# Patient Record
Sex: MALE | Race: WHITE | NOT HISPANIC OR LATINO | Employment: UNEMPLOYED | URBAN - METROPOLITAN AREA
[De-identification: names, ages, dates, MRNs, and addresses within clinical notes are randomized per-mention and may not be internally consistent; named-entity substitution may affect disease eponyms.]

---

## 2017-02-22 ENCOUNTER — TELEPHONE (OUTPATIENT)
Dept: FAMILY MEDICINE | Facility: CLINIC | Age: 42
End: 2017-02-22

## 2017-02-22 NOTE — TELEPHONE ENCOUNTER
----- Message from Vaughn Maher sent at 2017 11:23 AM CST -----  Contact: Wife - Radha Bran Jr.  MRN: [id number]  : 1975  PCP: Sheyla Vargas  Home Phone      [phone]  Work Phone      Not on file.  Mobile          [phone]      MESSAGE: new patient - Berenice Miller' son -- requesting to have Dr Eisenberg as his PCP -- was seen here as a child -- having back pain & is not satisfied with the Dr he is seeing in [place] - states all they do is give him pain medication, not looking for the cause of his pain -- [facility] medicaid -- will you accept    Call Radha @ [phone]    PCP: ???????

## 2017-02-24 ENCOUNTER — TELEPHONE (OUTPATIENT)
Dept: FAMILY MEDICINE | Facility: CLINIC | Age: 42
End: 2017-02-24

## 2017-02-24 NOTE — TELEPHONE ENCOUNTER
----- Message from Vaughn Maher sent at 2017  4:05 PM CST -----  Contact: Mom - Berenice Bashir Shant Martinez.  MRN: [id number]  : 1975  PCP: Sheyla Vargas  Home Phone      [phone]  Work Phone      Not on file.  Mobile          [phone]      MESSAGE: patient requested Dr Eisenberg take him on as a new patient - informed them that Dr Eisenberg declined to see patient -- Ms Miller expressed that she has no confidence in any of the other physicians taking him on & would like us to ask again if Dr Eisenberg will take patient on     call Berenice @ [phone]    PCP: ????

## 2017-03-01 ENCOUNTER — TELEPHONE (OUTPATIENT)
Dept: FAMILY MEDICINE | Facility: CLINIC | Age: 42
End: 2017-03-01

## 2017-03-01 NOTE — TELEPHONE ENCOUNTER
----- Message from Summer Sea sent at 3/1/2017 11:13 AM CST -----  Contact: self  Maurice Bran Jr.  MRN: [id number]  : 1975  PCP: Sheyla Vargas  Home Phone      [phone]  Work Phone      Not on file.  Mobile          [phone]      MESSAGE: pt says elvia hay spoke with [facility] and he ok'd seeing pt here for back pn. Please call pt to schedule appt asap.    Phone: [phone]

## 2017-03-17 ENCOUNTER — OFFICE VISIT (OUTPATIENT)
Dept: FAMILY MEDICINE | Facility: CLINIC | Age: 42
End: 2017-03-17
Payer: MEDICAID

## 2017-03-17 VITALS
BODY MASS INDEX: 25.69 KG/M2 | DIASTOLIC BLOOD PRESSURE: 80 MMHG | SYSTOLIC BLOOD PRESSURE: 110 MMHG | WEIGHT: 200.19 LBS | HEIGHT: 74 IN | HEART RATE: 72 BPM

## 2017-03-17 DIAGNOSIS — M54.42 CHRONIC MIDLINE LOW BACK PAIN WITH LEFT-SIDED SCIATICA: ICD-10-CM

## 2017-03-17 DIAGNOSIS — M54.41 CHRONIC RIGHT-SIDED LOW BACK PAIN WITH RIGHT-SIDED SCIATICA: ICD-10-CM

## 2017-03-17 DIAGNOSIS — M53.3 SACROILIAC JOINT PAIN: ICD-10-CM

## 2017-03-17 DIAGNOSIS — G89.29 CHRONIC RIGHT-SIDED LOW BACK PAIN WITH RIGHT-SIDED SCIATICA: ICD-10-CM

## 2017-03-17 DIAGNOSIS — M48.02 FORAMINAL STENOSIS OF CERVICAL REGION: ICD-10-CM

## 2017-03-17 DIAGNOSIS — M47.22 OSTEOARTHRITIS OF SPINE WITH RADICULOPATHY, CERVICAL REGION: Primary | ICD-10-CM

## 2017-03-17 DIAGNOSIS — M54.2 NECK PAIN: ICD-10-CM

## 2017-03-17 DIAGNOSIS — G89.29 CHRONIC MIDLINE LOW BACK PAIN WITH LEFT-SIDED SCIATICA: ICD-10-CM

## 2017-03-17 DIAGNOSIS — F17.200 TOBACCO USE DISORDER: ICD-10-CM

## 2017-03-17 DIAGNOSIS — M54.12 CERVICAL RADICULOPATHY: ICD-10-CM

## 2017-03-17 DIAGNOSIS — M54.42 CHRONIC BILATERAL LOW BACK PAIN WITH BILATERAL SCIATICA: ICD-10-CM

## 2017-03-17 DIAGNOSIS — M54.41 CHRONIC BILATERAL LOW BACK PAIN WITH BILATERAL SCIATICA: ICD-10-CM

## 2017-03-17 DIAGNOSIS — G89.29 CHRONIC BILATERAL LOW BACK PAIN WITH BILATERAL SCIATICA: ICD-10-CM

## 2017-03-17 PROBLEM — M54.40 CHRONIC RIGHT-SIDED LOW BACK PAIN WITH SCIATICA: Status: ACTIVE | Noted: 2017-03-17

## 2017-03-17 PROCEDURE — 99999 PR PBB SHADOW E&M-EST. PATIENT-LVL III: CPT | Mod: PBBFAC,,, | Performed by: FAMILY MEDICINE

## 2017-03-17 PROCEDURE — 99213 OFFICE O/P EST LOW 20 MIN: CPT | Mod: PBBFAC | Performed by: FAMILY MEDICINE

## 2017-03-17 PROCEDURE — 99204 OFFICE O/P NEW MOD 45 MIN: CPT | Mod: S$PBB,,, | Performed by: FAMILY MEDICINE

## 2017-03-17 RX ORDER — HYDROCODONE BITARTRATE AND ACETAMINOPHEN 7.5; 325 MG/1; MG/1
1 TABLET ORAL 2 TIMES DAILY
Qty: 60 TABLET | Refills: 0 | Status: SHIPPED | OUTPATIENT
Start: 2017-03-17 | End: 2017-03-17 | Stop reason: SDUPTHER

## 2017-03-17 RX ORDER — AMITRIPTYLINE HYDROCHLORIDE 10 MG/1
10 TABLET, FILM COATED ORAL NIGHTLY PRN
Qty: 30 TABLET | Refills: 5 | Status: SHIPPED | OUTPATIENT
Start: 2017-03-17 | End: 2017-07-24 | Stop reason: SDUPTHER

## 2017-03-17 RX ORDER — CYCLOBENZAPRINE HCL 10 MG
10 TABLET ORAL NIGHTLY
Qty: 30 TABLET | Refills: 5 | Status: SHIPPED | OUTPATIENT
Start: 2017-03-17 | End: 2017-09-25 | Stop reason: SDUPTHER

## 2017-03-17 RX ORDER — HYDROCODONE BITARTRATE AND ACETAMINOPHEN 7.5; 325 MG/1; MG/1
1 TABLET ORAL 2 TIMES DAILY
Qty: 60 TABLET | Refills: 0 | Status: SHIPPED | OUTPATIENT
Start: 2017-05-17 | End: 2017-07-17

## 2017-03-17 RX ORDER — HYDROCODONE BITARTRATE AND ACETAMINOPHEN 7.5; 325 MG/1; MG/1
1 TABLET ORAL 2 TIMES DAILY
Qty: 60 TABLET | Refills: 0 | Status: SHIPPED | OUTPATIENT
Start: 2017-04-17 | End: 2017-05-28

## 2017-03-17 RX ORDER — CLONAZEPAM 1 MG/1
1 TABLET ORAL NIGHTLY
Qty: 30 TABLET | Refills: 5 | Status: SHIPPED | OUTPATIENT
Start: 2017-03-17 | End: 2017-07-24 | Stop reason: SDUPTHER

## 2017-03-17 RX ORDER — HYDROCODONE BITARTRATE AND ACETAMINOPHEN 7.5; 325 MG/1; MG/1
1 TABLET ORAL 2 TIMES DAILY
Qty: 60 TABLET | Refills: 0 | Status: SHIPPED | OUTPATIENT
Start: 2017-03-17 | End: 2017-03-27

## 2017-03-17 NOTE — MR AVS SNAPSHOT
[address] [address]  Phone: [phone]  Fax: [phone]                  Maurice Bran Jr.   3/17/2017 2:30 PM   Office Visit    Description:  Male : 1975   Provider:  Fam Eisenberg MD   Department:  [facility]           Reason for Visit     Establish Care     Back Pain     Neck Pain           Diagnoses this Visit        Comments    Osteoarthritis of spine with radiculopathy, cervical region    -  Primary     Chronic midline low back pain with left-sided sciatica         Chronic right-sided low back pain with right-sided sciatica         Tobacco use disorder         Cervical radiculopathy         Neck pain         Sacroiliac joint pain         Foraminal stenosis of cervical region         Chronic bilateral low back pain with bilateral sciatica                To Do List           Goals (5 Years of Data)     None      Follow-Up and Disposition     Return in about 3 months (around 2017).    Follow-up and Disposition History       These Medications        Disp Refills Start End    cyclobenzaprine (FLEXERIL) 10 MG tablet 30 tablet 5 3/17/2017     Take 1 tablet (10 mg total) by mouth every evening. - Oral    Pharmacy: [facility] [address] [place] [address] Ph #: [phone]       amitriptyline (ELAVIL) 10 MG tablet 30 tablet 5 3/17/2017     Take 1 tablet (10 mg total) by mouth nightly as needed for Insomnia. - Oral    Pharmacy: [facility] [address] [place] [address] Ph #: [phone]       clonazePAM (KLONOPIN) 1 MG tablet 30 tablet 5 3/17/2017 3/17/2018    Take 1 tablet (1 mg total) by mouth every evening. - Oral    Pharmacy: [facility] [address] [place] [address] Ph #: [phone]       hydrocodone-acetaminophen 7.5-325mg (NORCO) 7.5-325 mg per tablet 60 tablet 0 3/17/2017 3/27/2017    Take 1 tablet by mouth 2 (two) times daily. - Oral    Pharmacy:  [address] Ph #: [phone]       hydrocodone-acetaminophen 7.5-325mg (NORCO) 7.5-325 mg per tablet 60 tablet 0 3/17/2017 4/27/2017    Take 1 tablet by mouth 2 (two) times daily. - Oral    Pharmacy: [address] Ph #: [phone]       hydrocodone-acetaminophen 7.5-325mg (NORCO) 7.5-325 mg per tablet 60 tablet 0 3/17/2017 5/27/2017    Take 1 tablet by mouth 2 (two) times daily. - Oral    Pharmacy: [address] Ph #: [phone]         [facility]     [facility] On Call Nurse Care Line - 24/7 Assistance  Registered nurses in the [facility] On Call Center provide clinical advisement, health education, appointment booking, and other advisory services.  Call for this free service at [phone].             Medications           Message regarding Medications     Verify the changes and/or additions to your medication regime listed below are the same as discussed with your clinician today.  If any of these changes or additions are incorrect, please notify your healthcare provider.        START taking these NEW medications        Refills    clonazePAM (KLONOPIN) 1 MG tablet 5    Sig: Take 1 tablet (1 mg total) by mouth every evening.    Class: Normal    Route: Oral    hydrocodone-acetaminophen 7.5-325mg (NORCO) 7.5-325 mg per tablet 0    Sig: Take 1 tablet by mouth 2 (two) times daily.    Class: Normal    Route: Oral    hydrocodone-acetaminophen 7.5-325mg (NORCO) 7.5-325 mg per tablet 0    Sig: Take 1 tablet by mouth 2 (two) times daily.    Class: Print    Route: Oral    hydrocodone-acetaminophen 7.5-325mg (NORCO) 7.5-325 mg per tablet 0    Sig: Take 1 tablet by mouth 2 (two) times daily.    Class: Print    Route: Oral      CHANGE how you are taking these medications     Start Taking Instead of    cyclobenzaprine (FLEXERIL) 10 MG tablet cyclobenzaprine (FLEXERIL) 10 MG tablet  "   Dosage:  Take 1 tablet (10 mg total) by mouth every evening. Dosage:  Take 1 tablet (10 mg total) by mouth 3 (three) times daily as needed for Muscle spasms.    Reason for Change:  Patient no longer taking       STOP taking these medications     ibuprofen (ADVIL,MOTRIN) 600 MG tablet Take 600 mg by mouth 3 (three) times daily.    naproxen (NAPROSYN) 500 MG tablet Take 1 tablet (500 mg total) by mouth 2 (two) times daily as needed.           Verify that the below list of medications is an accurate representation of the medications you are currently taking.  If none reported, the list may be blank. If incorrect, please contact your healthcare provider. Carry this list with you in case of emergency.           Current Medications     amitriptyline (ELAVIL) 10 MG tablet Take 1 tablet (10 mg total) by mouth nightly as needed for Insomnia.    clonazePAM (KLONOPIN) 1 MG tablet Take 1 tablet (1 mg total) by mouth every evening.    cyclobenzaprine (FLEXERIL) 10 MG tablet Take 1 tablet (10 mg total) by mouth every evening.    hydrocodone-acetaminophen 7.5-325mg (NORCO) 7.5-325 mg per tablet Take 1 tablet by mouth 2 (two) times daily.    hydrocodone-acetaminophen 7.5-325mg (NORCO) 7.5-325 mg per tablet Take 1 tablet by mouth 2 (two) times daily.    hydrocodone-acetaminophen 7.5-325mg (NORCO) 7.5-325 mg per tablet Take 1 tablet by mouth 2 (two) times daily.           Clinical Reference Information           Your Vitals Were     BP Pulse Height Weight BMI    110/80 (BP Location: Left arm, Patient Position: Sitting, BP Method: Manual) 72 6' 2" (1.88 m) 90.8 kg (200 lb 3.2 oz) 25.7 kg/m2      Blood Pressure          Most Recent Value    BP  110/80      Allergies as of 3/17/2017     No Known Allergies      Immunizations Administered on Date of Encounter - 3/17/2017     None      Orders Placed During Today's Visit      Normal Orders This Visit    Ambulatory consult to Pain Clinic     Ambulatory referral to Smoking Cessation " Program       [facility] Sign-Up     Activating your [facility] account is as easy as 1-2-3!     1) Visit [email or web address], select Sign Up Now, enter this activation code and your date of birth, then select Next.  Activation code not generated  Current Patient Portal Status: Account disabled      2) Create a username and password to use when you visit [facility] in the future and select a security question in case you lose your password and select Next.    3) Enter your e-mail address and click Sign Up!    Additional Information  If you have questions, please e-mail [email or web address] or call [phone] to talk to our [facility] staff. Remember, [facility] is NOT to be used for urgent needs. For medical emergencies, dial 911.         Smoking Cessation     If you would like to quit smoking:   You may be eligible for free services if you are a [place] resident and started smoking cigarettes before September 1, 1988.  Call the Smoking Cessation [facility] ([facility]) toll free at [phone] or [phone].   Call 2-224-QUIT-NOW if you do not meet the above criteria.            Language Assistance Services     ATTENTION: Language assistance services are available, free of charge. Please call [phone].      ATENCIÓN: Si judiela frances, tiene a caruso disposición servicios gratuitos de asistencia lingüística. Llame al [phone].     CHÚ Ý: N?u b?n nói Ti?ng Vi?t, có các d?ch v? h? tr? ngôn ng? mi?n phí dành cho b?n. G?i s? [phone].         [facility] complies with applicable Federal civil rights laws and does not discriminate on the basis of race, color, national origin, age, disability, or sex.

## 2017-03-17 NOTE — PROGRESS NOTES
Subjective:       Patient ID: Maurice Bran Jr. is a 41 y.o. male.    Chief Complaint: Establish Care; Back Pain; and Neck Pain    Back Pain   This is a chronic problem. The current episode started more than 1 month ago. The problem has been gradually worsening since onset. The pain is present in the lumbar spine. The pain is at a severity of 8/10. The pain is moderate. The pain is the same all the time. Pertinent negatives include no abdominal pain, chest pain or dysuria.   Neck Pain    Pertinent negatives include no chest pain.     Review of Systems   Constitutional: Negative for appetite change.   HENT: Negative for congestion, ear pain, sneezing and sore throat.    Eyes: Negative for redness and visual disturbance.   Respiratory: Negative for cough, chest tightness and stridor.    Cardiovascular: Negative for chest pain.   Gastrointestinal: Negative for abdominal pain, blood in stool, diarrhea, nausea and vomiting.   Genitourinary: Negative for difficulty urinating, dysuria and hematuria.   Musculoskeletal: Positive for back pain and neck pain. Negative for arthralgias, joint swelling and myalgias.   Skin: Negative for rash.   Neurological: Negative for dizziness.   Psychiatric/Behavioral: Negative for agitation. The patient is not nervous/anxious.        Objective:      Physical Exam   Constitutional: He is oriented to person, place, and time. He appears well-developed and well-nourished.   HENT:   Head: Normocephalic.   Eyes: Pupils are equal, round, and reactive to light.   Neck: Normal range of motion. Neck supple. No thyromegaly present.   Cardiovascular: Normal rate and regular rhythm.  Exam reveals no friction rub.    No murmur heard.  Pulmonary/Chest: Effort normal. No respiratory distress. He has no wheezes.   Abdominal: There is no tenderness. There is no rebound and no guarding.   Musculoskeletal: He exhibits no edema or tenderness.   Stiffness on posture   Lymphadenopathy:     He has no  cervical adenopathy.   Neurological: He is alert and oriented to person, place, and time. He has normal reflexes. No cranial nerve deficit.   Skin: Skin is warm and dry.   Psychiatric: He has a normal mood and affect. Judgment and thought content normal.       Assessment:       1. Osteoarthritis of spine with radiculopathy, cervical region    2. Chronic midline low back pain with left-sided sciatica    3. Chronic right-sided low back pain with right-sided sciatica    4. Tobacco use disorder    5. Cervical radiculopathy    6. Neck pain    7. Sacroiliac joint pain    8. Foraminal stenosis of cervical region    9. Chronic bilateral low back pain with bilateral sciatica        Plan:   Maurice was seen today for establish care, back pain and neck pain.    Diagnoses and all orders for this visit:    Osteoarthritis of spine with radiculopathy, cervical region  -     amitriptyline (ELAVIL) 10 MG tablet; Take 1 tablet (10 mg total) by mouth nightly as needed for Insomnia.  -     clonazePAM (KLONOPIN) 1 MG tablet; Take 1 tablet (1 mg total) by mouth every evening.  -     hydrocodone-acetaminophen 7.5-325mg (NORCO) 7.5-325 mg per tablet; Take 1 tablet by mouth 2 (two) times daily.  -     Ambulatory consult to Pain Clinic    Chronic midline low back pain with left-sided sciatica  -     cyclobenzaprine (FLEXERIL) 10 MG tablet; Take 1 tablet (10 mg total) by mouth every evening.  -     Ambulatory consult to Pain Clinic    Chronic right-sided low back pain with right-sided sciatica    Tobacco use disorder  -     Ambulatory referral to Smoking Cessation Program    Cervical radiculopathy    Neck pain  -     cyclobenzaprine (FLEXERIL) 10 MG tablet; Take 1 tablet (10 mg total) by mouth every evening.    Sacroiliac joint pain  -     amitriptyline (ELAVIL) 10 MG tablet; Take 1 tablet (10 mg total) by mouth nightly as needed for Insomnia.    Foraminal stenosis of cervical region  -     amitriptyline (ELAVIL) 10 MG tablet; Take 1 tablet  (10 mg total) by mouth nightly as needed for Insomnia.  -     Ambulatory consult to Pain Clinic    Chronic bilateral low back pain with bilateral sciatica  -     amitriptyline (ELAVIL) 10 MG tablet; Take 1 tablet (10 mg total) by mouth nightly as needed for Insomnia.  -     Ambulatory consult to Pain Clinic

## 2017-03-21 ENCOUNTER — TELEPHONE (OUTPATIENT)
Dept: FAMILY MEDICINE | Facility: CLINIC | Age: 42
End: 2017-03-21

## 2017-03-21 NOTE — TELEPHONE ENCOUNTER
----- Message from Brie Joe sent at 3/21/2017  2:13 PM CDT -----  Contact: Brie/Dr. Macario's office  Maurice Bran Jr.  MRN: [id number]  : 1975  PCP: Sheyla Vargas  Home Phone      [phone]  Work Phone      Not on file.  Mobile          [phone]      MESSAGE: The patient is being referred by Dr. Eisenberg for Osteoarthritis of spine with radiculopathy, cervical region to Dr. Macario. The patient has Medicaid insurance. Dr. Macario does not accept only Medicaid insurance.  Phone:[phone]

## 2017-03-22 NOTE — TELEPHONE ENCOUNTER
Referral faxed to [facility] at [phone]. Attempted to contact pt, no answer.

## 2017-03-23 ENCOUNTER — TELEPHONE (OUTPATIENT)
Dept: FAMILY MEDICINE | Facility: CLINIC | Age: 42
End: 2017-03-23

## 2017-03-23 NOTE — TELEPHONE ENCOUNTER
----- Message from Praveen Burns sent at 3/23/2017 12:10 PM CDT -----  Contact: SELF   Maurice Bran Jr.  MRN: [id number]  : 1975  PCP: Sheyla Vargas  Home Phone      [phone]  Work Phone      Not on file.  Mobile          [phone]      MESSAGE: NEEDS A LETTER STATING WHAT HE IS AND IS NOT CAPABLE OF LIFTING FOR WORK TO SEND TO SOCIAL SECURITY. PT WAS SEEN LAST WEEK BY DR GRAY ON 17 AND FORGOT TO TALK ABOUT IT WITH HIM DURING THE VISIT . PLEASE CALL TO DISCUSS.     PHONE: [phone]

## 2017-03-23 NOTE — TELEPHONE ENCOUNTER
He would need a functional lift capacity exam by a Physical therapist or similar to determine the answer to amt of # for it to be accurate & official..

## 2017-03-24 NOTE — TELEPHONE ENCOUNTER
Patient notified of recommendations. Patient states pain management physician he was referred to does not take medicaid. States he will contact his insurance to locate in network physician and will see if they can do lift capacity test.

## 2017-04-10 ENCOUNTER — TELEPHONE (OUTPATIENT)
Dept: FAMILY MEDICINE | Facility: CLINIC | Age: 42
End: 2017-04-10

## 2017-04-10 NOTE — TELEPHONE ENCOUNTER
Received a letter from [facility] stating they could not take pt as a pt, due to their limit being reached in the Orthopedic dept. Referral sent to [facility] pt notified.

## 2017-04-11 ENCOUNTER — TELEPHONE (OUTPATIENT)
Dept: FAMILY MEDICINE | Facility: CLINIC | Age: 42
End: 2017-04-11

## 2017-04-11 NOTE — TELEPHONE ENCOUNTER
Orthopedic at [facility] does not do anything with the back, spine, or neck. Referral sent to Dr. Christos Pugh at [place].    Attempted to notify pt no answer, LM

## 2017-07-17 ENCOUNTER — TELEPHONE (OUTPATIENT)
Dept: FAMILY MEDICINE | Facility: CLINIC | Age: 42
End: 2017-07-17

## 2017-07-17 ENCOUNTER — OFFICE VISIT (OUTPATIENT)
Dept: INTERNAL MEDICINE | Facility: CLINIC | Age: 42
End: 2017-07-17
Payer: MEDICAID

## 2017-07-17 VITALS
WEIGHT: 184.06 LBS | HEIGHT: 74 IN | HEART RATE: 99 BPM | DIASTOLIC BLOOD PRESSURE: 78 MMHG | BODY MASS INDEX: 23.62 KG/M2 | RESPIRATION RATE: 20 BRPM | SYSTOLIC BLOOD PRESSURE: 116 MMHG

## 2017-07-17 DIAGNOSIS — M54.16 LUMBAR BACK PAIN WITH RADICULOPATHY AFFECTING LEFT LOWER EXTREMITY: Primary | ICD-10-CM

## 2017-07-17 DIAGNOSIS — M51.26 LUMBAR HERNIATED DISC: ICD-10-CM

## 2017-07-17 PROCEDURE — 99999 PR PBB SHADOW E&M-EST. PATIENT-LVL IV: CPT | Mod: PBBFAC,,, | Performed by: NURSE PRACTITIONER

## 2017-07-17 PROCEDURE — 99213 OFFICE O/P EST LOW 20 MIN: CPT | Mod: S$PBB,,, | Performed by: NURSE PRACTITIONER

## 2017-07-17 PROCEDURE — 99214 OFFICE O/P EST MOD 30 MIN: CPT | Mod: PBBFAC | Performed by: NURSE PRACTITIONER

## 2017-07-17 PROCEDURE — 96372 THER/PROPH/DIAG INJ SC/IM: CPT | Mod: PBBFAC

## 2017-07-17 RX ORDER — HYDROCODONE BITARTRATE AND ACETAMINOPHEN 7.5; 325 MG/1; MG/1
1 TABLET ORAL 2 TIMES DAILY
Qty: 20 TABLET | Refills: 0 | Status: SHIPPED | OUTPATIENT
Start: 2017-07-17 | End: 2017-07-24

## 2017-07-17 RX ORDER — METHYLPREDNISOLONE ACETATE 80 MG/ML
80 INJECTION, SUSPENSION INTRA-ARTICULAR; INTRALESIONAL; INTRAMUSCULAR; SOFT TISSUE
Status: COMPLETED | OUTPATIENT
Start: 2017-07-17 | End: 2017-07-17

## 2017-07-17 RX ORDER — KETOROLAC TROMETHAMINE 30 MG/ML
60 INJECTION, SOLUTION INTRAMUSCULAR; INTRAVENOUS
Status: COMPLETED | OUTPATIENT
Start: 2017-07-17 | End: 2017-07-17

## 2017-07-17 RX ADMIN — KETOROLAC TROMETHAMINE 60 MG: 60 INJECTION, SOLUTION INTRAMUSCULAR at 02:07

## 2017-07-17 RX ADMIN — METHYLPREDNISOLONE ACETATE 80 MG: 80 INJECTION, SUSPENSION INTRA-ARTICULAR; INTRALESIONAL; INTRAMUSCULAR; SOFT TISSUE at 02:07

## 2017-07-17 NOTE — PROGRESS NOTES
Subjective:       Patient ID: Maurice Bran Jr. is a 41 y.o. male.    Chief Complaint: Leg Pain (left)    HPI: pt new to me but known to Dr. Eisenberg and [facility] presents with acute low back pain with radiating pain down the left leg x 2 days. Denies any acute injury. Reports taking ibuprofen otc with no relief. Reports seeing Dr. Eisenberg for norco as needed.     Review of Systems   Constitutional: Negative for chills, diaphoresis, fatigue and fever.   Eyes: Negative for visual disturbance.   Respiratory: Negative for cough, shortness of breath and wheezing.    Cardiovascular: Negative for chest pain.   Gastrointestinal: Negative for abdominal distention, abdominal pain, constipation, diarrhea, nausea and vomiting.   Genitourinary: Negative for difficulty urinating.   Musculoskeletal: Positive for back pain and gait problem. Negative for arthralgias and myalgias.   Neurological: Negative for headaches.   Psychiatric/Behavioral: Negative for sleep disturbance.       Objective:      Physical Exam   Constitutional: He is oriented to person, place, and time. He appears well-developed and well-nourished.   HENT:   Head: Normocephalic and atraumatic.   Cardiovascular: Normal rate, regular rhythm and normal heart sounds.    Pulmonary/Chest: Effort normal and breath sounds normal.   Abdominal: Soft. Bowel sounds are normal.   Musculoskeletal: He exhibits tenderness. He exhibits no edema.        Arms:  Neurological: He is alert and oriented to person, place, and time.   Skin: Skin is warm and dry. No pallor.   Psychiatric: He has a normal mood and affect. His behavior is normal. Judgment and thought content normal.   Nursing note and vitals reviewed.      Assessment:       1. Lumbar back pain with radiculopathy affecting left lower extremity    2. Lumbar herniated disc        Plan:     1. Lumbar back pain with radiculopathy affecting left lower extremity    - Ambulatory Referral to Neurosurgery  - methylPREDNISolone  acetate injection 80 mg; Inject 1 mL (80 mg total) into the muscle one time.  - ketorolac injection 60 mg; Inject 2 mLs (60 mg total) into the muscle one time.  - hydrocodone-acetaminophen 7.5-325mg (NORCO) 7.5-325 mg per tablet; Take 1 tablet by mouth 2 (two) times daily.  Dispense: 20 tablet; Refill: 0    2. Lumbar herniated disc    - Ambulatory Referral to Neurosurgery  - methylPREDNISolone acetate injection 80 mg; Inject 1 mL (80 mg total) into the muscle one time.  - ketorolac injection 60 mg; Inject 2 mLs (60 mg total) into the muscle one time.  - hydrocodone-acetaminophen 7.5-325mg (NORCO) 7.5-325 mg per tablet; Take 1 tablet by mouth 2 (two) times daily.  Dispense: 20 tablet; Refill: 0    Advised on getting MRI to Dr. Ame garcia. Will give injections and enough pain meds to last him until he can see Dr. Eisenberg next week. He is in obvious discomfort today with MRI c/w s/s.

## 2017-07-17 NOTE — TELEPHONE ENCOUNTER
----- Message from Jennifer Mendoza sent at 2017  8:47 AM CDT -----  Contact: Rosalee/Wife  Maurice Bordenups .  MRN: [id number]  : 1975  PCP: Sheyla Vargas  Home Phone      [phone]  Work Phone      Not on file.  Mobile          [phone]    MESSAGE: Having pains in legs for a couple of days.  Having difficulty walking.  Would like to be seen today if possible    Phone:  [phone]

## 2017-07-17 NOTE — TELEPHONE ENCOUNTER
Pt was called, pt has pain shooting from his back to his leg. I scheduled pt to see Racheal Wellington at 1:15 pm. I advised the pt if symptoms worsen before then to go to the ER. Verbalized understanding.

## 2017-07-24 ENCOUNTER — OFFICE VISIT (OUTPATIENT)
Dept: FAMILY MEDICINE | Facility: CLINIC | Age: 42
End: 2017-07-24
Payer: MEDICAID

## 2017-07-24 VITALS
BODY MASS INDEX: 23.53 KG/M2 | HEIGHT: 74 IN | WEIGHT: 183.38 LBS | HEART RATE: 84 BPM | DIASTOLIC BLOOD PRESSURE: 68 MMHG | SYSTOLIC BLOOD PRESSURE: 114 MMHG

## 2017-07-24 DIAGNOSIS — M47.22 OSTEOARTHRITIS OF SPINE WITH RADICULOPATHY, CERVICAL REGION: ICD-10-CM

## 2017-07-24 DIAGNOSIS — M54.9 SEVERE BACK PAIN: ICD-10-CM

## 2017-07-24 DIAGNOSIS — M54.41 CHRONIC RIGHT-SIDED LOW BACK PAIN WITH RIGHT-SIDED SCIATICA: Primary | ICD-10-CM

## 2017-07-24 DIAGNOSIS — G89.29 CHRONIC BILATERAL LOW BACK PAIN WITH BILATERAL SCIATICA: ICD-10-CM

## 2017-07-24 DIAGNOSIS — M54.41 CHRONIC BILATERAL LOW BACK PAIN WITH BILATERAL SCIATICA: ICD-10-CM

## 2017-07-24 DIAGNOSIS — G89.29 CHRONIC RIGHT-SIDED LOW BACK PAIN WITH RIGHT-SIDED SCIATICA: Primary | ICD-10-CM

## 2017-07-24 DIAGNOSIS — M54.32 SCIATICA OF LEFT SIDE: ICD-10-CM

## 2017-07-24 DIAGNOSIS — M54.42 CHRONIC BILATERAL LOW BACK PAIN WITH BILATERAL SCIATICA: ICD-10-CM

## 2017-07-24 DIAGNOSIS — M48.02 FORAMINAL STENOSIS OF CERVICAL REGION: ICD-10-CM

## 2017-07-24 DIAGNOSIS — M53.3 SACROILIAC JOINT PAIN: ICD-10-CM

## 2017-07-24 PROCEDURE — 99999 PR PBB SHADOW E&M-EST. PATIENT-LVL II: CPT | Mod: PBBFAC,,, | Performed by: FAMILY MEDICINE

## 2017-07-24 PROCEDURE — 96372 THER/PROPH/DIAG INJ SC/IM: CPT | Mod: PBBFAC

## 2017-07-24 PROCEDURE — 99214 OFFICE O/P EST MOD 30 MIN: CPT | Mod: S$PBB,,, | Performed by: FAMILY MEDICINE

## 2017-07-24 PROCEDURE — 99212 OFFICE O/P EST SF 10 MIN: CPT | Mod: PBBFAC | Performed by: FAMILY MEDICINE

## 2017-07-24 RX ORDER — NAPROXEN 500 MG/1
500 TABLET ORAL 2 TIMES DAILY
Qty: 60 TABLET | Refills: 3 | Status: SHIPPED | OUTPATIENT
Start: 2017-07-24 | End: 2017-09-25 | Stop reason: ALTCHOICE

## 2017-07-24 RX ORDER — CLONAZEPAM 1 MG/1
1 TABLET ORAL NIGHTLY
Qty: 30 TABLET | Refills: 5 | Status: SHIPPED | OUTPATIENT
Start: 2017-07-24 | End: 2017-09-25 | Stop reason: SDUPTHER

## 2017-07-24 RX ORDER — HYDROCODONE BITARTRATE AND ACETAMINOPHEN 10; 325 MG/1; MG/1
1 TABLET ORAL 2 TIMES DAILY PRN
Qty: 60 TABLET | Refills: 0 | Status: SHIPPED | OUTPATIENT
Start: 2017-07-24 | End: 2017-09-25 | Stop reason: ALTCHOICE

## 2017-07-24 RX ORDER — AMITRIPTYLINE HYDROCHLORIDE 10 MG/1
10 TABLET, FILM COATED ORAL NIGHTLY PRN
Qty: 30 TABLET | Refills: 5 | Status: SHIPPED | OUTPATIENT
Start: 2017-07-24 | End: 2017-09-25 | Stop reason: SDUPTHER

## 2017-07-24 RX ORDER — KETOROLAC TROMETHAMINE 30 MG/ML
60 INJECTION, SOLUTION INTRAMUSCULAR; INTRAVENOUS
Status: COMPLETED | OUTPATIENT
Start: 2017-07-24 | End: 2017-07-24

## 2017-07-24 RX ADMIN — KETOROLAC TROMETHAMINE 60 MG: 60 INJECTION, SOLUTION INTRAMUSCULAR at 02:07

## 2017-07-24 NOTE — PROGRESS NOTES
Subjective:       Patient ID: Maurice Bran Jr. is a 41 y.o. male.    Chief Complaint: Back Pain    Pt is a 41 y.o. male who presents for check up for Chronic right-sided low back pain with right-sided sciatica  (primary encounter diagnosis). Doing well on current meds. Denies any side effects. Prevention is up to date.      Back Pain   This is a recurrent problem. The problem has been rapidly worsening since onset. The pain is present in the lumbar spine. The pain is at a severity of 9/10. The pain is severe. The pain is the same all the time. Associated symptoms include leg pain and numbness. Pertinent negatives include no abdominal pain, chest pain or dysuria.     Review of Systems   Constitutional: Negative for appetite change.   HENT: Negative for congestion, ear pain, sneezing and sore throat.    Eyes: Negative for redness and visual disturbance.   Respiratory: Negative for cough, chest tightness and stridor.    Cardiovascular: Negative for chest pain.   Gastrointestinal: Negative for abdominal pain, blood in stool, diarrhea, nausea and vomiting.   Genitourinary: Negative for difficulty urinating, dysuria and hematuria.   Musculoskeletal: Positive for back pain. Negative for arthralgias, joint swelling, myalgias and neck pain.        9/10 L buttock's into  LLE   Skin: Negative for rash.   Neurological: Positive for numbness. Negative for dizziness.   Psychiatric/Behavioral: Negative for agitation. The patient is not nervous/anxious.        Objective:      Physical Exam   Constitutional: He is oriented to person, place, and time. He appears well-developed and well-nourished.   HENT:   Head: Normocephalic and atraumatic.   Eyes: Pupils are equal, round, and reactive to light.   Neck: Normal range of motion. Neck supple. No thyromegaly present.   Cardiovascular: Normal rate, regular rhythm, normal heart sounds and intact distal pulses.  Exam reveals no friction rub.    No murmur heard.  Pulmonary/Chest:  Effort normal and breath sounds normal. No respiratory distress. He has no wheezes.   Abdominal: Soft. Bowel sounds are normal. There is no tenderness. There is no rebound and no guarding.   Musculoskeletal: Normal range of motion. He exhibits no edema or tenderness.   Lymphadenopathy:     He has no cervical adenopathy.   Neurological: He is alert and oriented to person, place, and time. He has normal reflexes. No cranial nerve deficit.   Skin: Skin is warm and dry.   Psychiatric: He has a normal mood and affect. His behavior is normal. Judgment and thought content normal.   Nursing note and vitals reviewed.      Assessment:       1. Chronic right-sided low back pain with right-sided sciatica    2. Severe back pain        Plan:   Maurice was seen today for back pain.    Diagnoses and all orders for this visit:    Chronic right-sided low back pain with right-sided sciatica  -     Ambulatory referral to Neurosurgery    Severe back pain  -     Ambulatory referral to Neurosurgery

## 2017-09-25 ENCOUNTER — OFFICE VISIT (OUTPATIENT)
Dept: FAMILY MEDICINE | Facility: CLINIC | Age: 42
End: 2017-09-25
Payer: MEDICAID

## 2017-09-25 VITALS
BODY MASS INDEX: 22.24 KG/M2 | SYSTOLIC BLOOD PRESSURE: 118 MMHG | DIASTOLIC BLOOD PRESSURE: 72 MMHG | OXYGEN SATURATION: 96 % | RESPIRATION RATE: 16 BRPM | HEIGHT: 74 IN | HEART RATE: 94 BPM | WEIGHT: 173.31 LBS

## 2017-09-25 DIAGNOSIS — M54.32 SCIATICA OF LEFT SIDE: ICD-10-CM

## 2017-09-25 DIAGNOSIS — M54.42 CHRONIC BILATERAL LOW BACK PAIN WITH BILATERAL SCIATICA: ICD-10-CM

## 2017-09-25 DIAGNOSIS — G89.29 CHRONIC BILATERAL LOW BACK PAIN WITH BILATERAL SCIATICA: ICD-10-CM

## 2017-09-25 DIAGNOSIS — M54.9 SEVERE BACK PAIN: ICD-10-CM

## 2017-09-25 DIAGNOSIS — G89.29 CHRONIC MIDLINE LOW BACK PAIN WITH LEFT-SIDED SCIATICA: ICD-10-CM

## 2017-09-25 DIAGNOSIS — M54.12 CERVICAL RADICULOPATHY: Primary | ICD-10-CM

## 2017-09-25 DIAGNOSIS — G89.29 CHRONIC RIGHT-SIDED LOW BACK PAIN WITH RIGHT-SIDED SCIATICA: ICD-10-CM

## 2017-09-25 DIAGNOSIS — M48.02 FORAMINAL STENOSIS OF CERVICAL REGION: ICD-10-CM

## 2017-09-25 DIAGNOSIS — M54.41 CHRONIC BILATERAL LOW BACK PAIN WITH BILATERAL SCIATICA: ICD-10-CM

## 2017-09-25 DIAGNOSIS — M54.2 NECK PAIN: ICD-10-CM

## 2017-09-25 DIAGNOSIS — M53.3 SACROILIAC JOINT PAIN: ICD-10-CM

## 2017-09-25 DIAGNOSIS — Z12.5 SCREENING FOR PROSTATE CANCER: ICD-10-CM

## 2017-09-25 DIAGNOSIS — M54.41 CHRONIC RIGHT-SIDED LOW BACK PAIN WITH RIGHT-SIDED SCIATICA: ICD-10-CM

## 2017-09-25 DIAGNOSIS — M54.42 CHRONIC MIDLINE LOW BACK PAIN WITH LEFT-SIDED SCIATICA: ICD-10-CM

## 2017-09-25 DIAGNOSIS — E55.9 VITAMIN D INSUFFICIENCY: ICD-10-CM

## 2017-09-25 DIAGNOSIS — M47.22 OSTEOARTHRITIS OF SPINE WITH RADICULOPATHY, CERVICAL REGION: ICD-10-CM

## 2017-09-25 LAB
ALBUMIN SERPL BCP-MCNC: 3.7 G/DL
ALP SERPL-CCNC: 63 U/L
ALT SERPL W/O P-5'-P-CCNC: 15 U/L
ANION GAP SERPL CALC-SCNC: 7 MMOL/L
AST SERPL-CCNC: 16 U/L
BILIRUB SERPL-MCNC: 0.2 MG/DL
BUN SERPL-MCNC: 16 MG/DL
CALCIUM SERPL-MCNC: 9.9 MG/DL
CHLORIDE SERPL-SCNC: 106 MMOL/L
CO2 SERPL-SCNC: 27 MMOL/L
COMPLEXED PSA SERPL-MCNC: 0.26 NG/ML
CREAT SERPL-MCNC: 0.7 MG/DL
EST. GFR  (AFRICAN AMERICAN): >60 ML/MIN/1.73 M^2
EST. GFR  (NON AFRICAN AMERICAN): >60 ML/MIN/1.73 M^2
GLUCOSE SERPL-MCNC: 84 MG/DL
POTASSIUM SERPL-SCNC: 4.2 MMOL/L
PROT SERPL-MCNC: 7.3 G/DL
SODIUM SERPL-SCNC: 140 MMOL/L

## 2017-09-25 PROCEDURE — 36415 COLL VENOUS BLD VENIPUNCTURE: CPT | Mod: PBBFAC

## 2017-09-25 PROCEDURE — 99999 PR PBB SHADOW E&M-EST. PATIENT-LVL III: CPT | Mod: PBBFAC,,, | Performed by: FAMILY MEDICINE

## 2017-09-25 PROCEDURE — 3008F BODY MASS INDEX DOCD: CPT | Mod: ,,, | Performed by: FAMILY MEDICINE

## 2017-09-25 PROCEDURE — 84153 ASSAY OF PSA TOTAL: CPT

## 2017-09-25 PROCEDURE — 82306 VITAMIN D 25 HYDROXY: CPT

## 2017-09-25 PROCEDURE — 80053 COMPREHEN METABOLIC PANEL: CPT

## 2017-09-25 PROCEDURE — 99213 OFFICE O/P EST LOW 20 MIN: CPT | Mod: PBBFAC | Performed by: FAMILY MEDICINE

## 2017-09-25 PROCEDURE — 99213 OFFICE O/P EST LOW 20 MIN: CPT | Mod: S$PBB,,, | Performed by: FAMILY MEDICINE

## 2017-09-25 RX ORDER — NAPROXEN 500 MG/1
500 TABLET ORAL 2 TIMES DAILY
Qty: 60 TABLET | Refills: 3 | Status: CANCELLED | OUTPATIENT
Start: 2017-09-25

## 2017-09-25 RX ORDER — HYDROCODONE BITARTRATE AND ACETAMINOPHEN 10; 325 MG/1; MG/1
1 TABLET ORAL 2 TIMES DAILY PRN
Qty: 60 TABLET | Refills: 0 | Status: CANCELLED | OUTPATIENT
Start: 2017-09-25

## 2017-09-25 RX ORDER — AMITRIPTYLINE HYDROCHLORIDE 10 MG/1
10 TABLET, FILM COATED ORAL NIGHTLY PRN
Qty: 30 TABLET | Refills: 5 | Status: SHIPPED | OUTPATIENT
Start: 2017-09-25 | End: 2017-11-09 | Stop reason: SDUPTHER

## 2017-09-25 RX ORDER — CYCLOBENZAPRINE HCL 10 MG
10 TABLET ORAL NIGHTLY
Qty: 30 TABLET | Refills: 5 | Status: SHIPPED | OUTPATIENT
Start: 2017-09-25 | End: 2017-11-09 | Stop reason: SDUPTHER

## 2017-09-25 RX ORDER — HYDROCODONE BITARTRATE AND ACETAMINOPHEN 7.5; 325 MG/1; MG/1
1 TABLET ORAL EVERY 8 HOURS PRN
Qty: 90 TABLET | Refills: 0 | Status: SHIPPED | OUTPATIENT
Start: 2017-09-25 | End: 2017-10-05

## 2017-09-25 RX ORDER — CLONAZEPAM 1 MG/1
1 TABLET ORAL NIGHTLY
Qty: 30 TABLET | Refills: 5 | Status: SHIPPED | OUTPATIENT
Start: 2017-09-25 | End: 2017-11-09 | Stop reason: SDUPTHER

## 2017-09-25 RX ORDER — DICLOFENAC SODIUM 75 MG/1
75 TABLET, DELAYED RELEASE ORAL 2 TIMES DAILY
Qty: 60 TABLET | Refills: 3 | Status: SHIPPED | OUTPATIENT
Start: 2017-09-25 | End: 2017-11-09 | Stop reason: SDUPTHER

## 2017-09-25 NOTE — PROGRESS NOTES
Subjective:       Patient ID: Maurice Bran Jr. is a 41 y.o. male.    Chief Complaint: Back Pain and Medication Refill    Pt is a 41 y.o. male who presents for check up for Severe back pain  Sciatica of left side  Chronic right-sided low back pain with right-sided sciatica  Chronic midline low back pain with left-sided sciatica  Neck pain  Osteoarthritis of spine with radiculopathy, cervical region  Sacroiliac joint pain  Foraminal stenosis of cervical region  Chronic bilateral low back pain with bilateral sciatica  Cervical radiculopathy  (primary encounter diagnosis). Doing well on current meds. Denies any side effects. Prevention is up to date.      Back Pain   Pertinent negatives include no abdominal pain, chest pain or dysuria.   Medication Refill   Pertinent negatives include no abdominal pain, arthralgias, chest pain, congestion, coughing, joint swelling, myalgias, nausea, neck pain, rash, sore throat or vomiting.     Review of Systems   Constitutional: Negative for appetite change.   HENT: Negative for congestion, ear pain, sneezing and sore throat.    Eyes: Negative for redness and visual disturbance.   Respiratory: Negative for cough, chest tightness and stridor.    Cardiovascular: Negative for chest pain.   Gastrointestinal: Negative for abdominal pain, blood in stool, diarrhea, nausea and vomiting.   Genitourinary: Negative for difficulty urinating, dysuria and hematuria.   Musculoskeletal: Positive for back pain. Negative for arthralgias, joint swelling, myalgias and neck pain.   Skin: Negative for rash.   Neurological: Negative for dizziness.   Psychiatric/Behavioral: Negative for agitation. The patient is not nervous/anxious.        Objective:      Physical Exam   Constitutional: He is oriented to person, place, and time. He appears well-developed and well-nourished. He appears distressed.   Stiff 42 y/o W M w neck soreness & lower back pain   HENT:   Head: Normocephalic.   Eyes: Pupils are  equal, round, and reactive to light.   Neck: Normal range of motion. Neck supple. No thyromegaly present.   Cardiovascular: Normal rate and regular rhythm.  Exam reveals no friction rub.    No murmur heard.  Pulmonary/Chest: Effort normal. No respiratory distress. He has no wheezes.   Abdominal: There is no tenderness. There is no rebound and no guarding.   Musculoskeletal: Normal range of motion. He exhibits no edema or tenderness.   Lymphadenopathy:     He has no cervical adenopathy.   Neurological: He is alert and oriented to person, place, and time. He has normal reflexes. No cranial nerve deficit.   Skin: Skin is warm and dry.   Psychiatric: He has a normal mood and affect. Judgment and thought content normal.       Assessment:       1. Cervical radiculopathy    2. Severe back pain    3. Sciatica of left side    4. Chronic right-sided low back pain with right-sided sciatica    5. Chronic midline low back pain with left-sided sciatica    6. Neck pain    7. Osteoarthritis of spine with radiculopathy, cervical region    8. Sacroiliac joint pain    9. Foraminal stenosis of cervical region    10. Chronic bilateral low back pain with bilateral sciatica        Plan:   Maurice was seen today for back pain and medication refill.    Diagnoses and all orders for this visit:    Cervical radiculopathy    Severe back pain  -     naproxen (NAPROSYN) 500 MG tablet; Take 1 tablet (500 mg total) by mouth 2 (two) times daily.  -     hydrocodone-acetaminophen 10-325mg (NORCO)  mg Tab; Take 1 tablet by mouth 2 (two) times daily as needed.    Sciatica of left side  -     naproxen (NAPROSYN) 500 MG tablet; Take 1 tablet (500 mg total) by mouth 2 (two) times daily.  -     hydrocodone-acetaminophen 10-325mg (NORCO)  mg Tab; Take 1 tablet by mouth 2 (two) times daily as needed.    Chronic right-sided low back pain with right-sided sciatica  -     hydrocodone-acetaminophen 10-325mg (NORCO)  mg Tab; Take 1 tablet by  mouth 2 (two) times daily as needed.    Chronic midline low back pain with left-sided sciatica  -     cyclobenzaprine (FLEXERIL) 10 MG tablet; Take 1 tablet (10 mg total) by mouth every evening.    Neck pain  -     cyclobenzaprine (FLEXERIL) 10 MG tablet; Take 1 tablet (10 mg total) by mouth every evening.    Osteoarthritis of spine with radiculopathy, cervical region  -     clonazePAM (KLONOPIN) 1 MG tablet; Take 1 tablet (1 mg total) by mouth every evening.  -     amitriptyline (ELAVIL) 10 MG tablet; Take 1 tablet (10 mg total) by mouth nightly as needed for Insomnia.    Sacroiliac joint pain  -     amitriptyline (ELAVIL) 10 MG tablet; Take 1 tablet (10 mg total) by mouth nightly as needed for Insomnia.    Foraminal stenosis of cervical region  -     amitriptyline (ELAVIL) 10 MG tablet; Take 1 tablet (10 mg total) by mouth nightly as needed for Insomnia.    Chronic bilateral low back pain with bilateral sciatica  -     amitriptyline (ELAVIL) 10 MG tablet; Take 1 tablet (10 mg total) by mouth nightly as needed for Insomnia.

## 2017-09-26 LAB — 25(OH)D3+25(OH)D2 SERPL-MCNC: 33 NG/ML

## 2017-11-09 ENCOUNTER — OFFICE VISIT (OUTPATIENT)
Dept: FAMILY MEDICINE | Facility: CLINIC | Age: 42
End: 2017-11-09
Payer: MEDICAID

## 2017-11-09 VITALS
SYSTOLIC BLOOD PRESSURE: 112 MMHG | HEART RATE: 92 BPM | RESPIRATION RATE: 20 BRPM | BODY MASS INDEX: 21.46 KG/M2 | DIASTOLIC BLOOD PRESSURE: 68 MMHG | HEIGHT: 74 IN | WEIGHT: 167.19 LBS

## 2017-11-09 DIAGNOSIS — M53.3 SACROILIAC JOINT PAIN: ICD-10-CM

## 2017-11-09 DIAGNOSIS — M54.42 CHRONIC BILATERAL LOW BACK PAIN WITH BILATERAL SCIATICA: ICD-10-CM

## 2017-11-09 DIAGNOSIS — G89.29 CHRONIC MIDLINE LOW BACK PAIN WITH LEFT-SIDED SCIATICA: ICD-10-CM

## 2017-11-09 DIAGNOSIS — G89.29 CHRONIC RIGHT-SIDED LOW BACK PAIN WITH RIGHT-SIDED SCIATICA: ICD-10-CM

## 2017-11-09 DIAGNOSIS — M54.41 CHRONIC RIGHT-SIDED LOW BACK PAIN WITH RIGHT-SIDED SCIATICA: ICD-10-CM

## 2017-11-09 DIAGNOSIS — M54.41 CHRONIC BILATERAL LOW BACK PAIN WITH BILATERAL SCIATICA: ICD-10-CM

## 2017-11-09 DIAGNOSIS — M54.2 NECK PAIN: ICD-10-CM

## 2017-11-09 DIAGNOSIS — G89.29 CHRONIC BILATERAL LOW BACK PAIN WITH BILATERAL SCIATICA: ICD-10-CM

## 2017-11-09 DIAGNOSIS — M48.02 FORAMINAL STENOSIS OF CERVICAL REGION: ICD-10-CM

## 2017-11-09 DIAGNOSIS — M54.42 CHRONIC MIDLINE LOW BACK PAIN WITH LEFT-SIDED SCIATICA: ICD-10-CM

## 2017-11-09 DIAGNOSIS — L30.1 DYSHIDROSIS: Primary | ICD-10-CM

## 2017-11-09 DIAGNOSIS — M54.9 SEVERE BACK PAIN: ICD-10-CM

## 2017-11-09 DIAGNOSIS — M47.22 OSTEOARTHRITIS OF SPINE WITH RADICULOPATHY, CERVICAL REGION: ICD-10-CM

## 2017-11-09 PROCEDURE — 99212 OFFICE O/P EST SF 10 MIN: CPT | Mod: PBBFAC | Performed by: FAMILY MEDICINE

## 2017-11-09 PROCEDURE — 99213 OFFICE O/P EST LOW 20 MIN: CPT | Mod: S$PBB,,, | Performed by: FAMILY MEDICINE

## 2017-11-09 PROCEDURE — 99999 PR PBB SHADOW E&M-EST. PATIENT-LVL II: CPT | Mod: PBBFAC,,, | Performed by: FAMILY MEDICINE

## 2017-11-09 RX ORDER — AMITRIPTYLINE HYDROCHLORIDE 10 MG/1
10 TABLET, FILM COATED ORAL NIGHTLY PRN
Qty: 30 TABLET | Refills: 5 | Status: SHIPPED | OUTPATIENT
Start: 2017-11-09 | End: 2018-01-16

## 2017-11-09 RX ORDER — CLOBETASOL PROPIONATE 0.5 MG/G
CREAM TOPICAL 2 TIMES DAILY
Qty: 45 G | Refills: 2 | Status: SHIPPED | OUTPATIENT
Start: 2017-11-09 | End: 2018-01-16

## 2017-11-09 RX ORDER — CLONAZEPAM 1 MG/1
1 TABLET ORAL NIGHTLY
Qty: 30 TABLET | Refills: 5 | Status: SHIPPED | OUTPATIENT
Start: 2017-11-09 | End: 2018-11-09

## 2017-11-09 RX ORDER — DICLOFENAC SODIUM 75 MG/1
75 TABLET, DELAYED RELEASE ORAL 2 TIMES DAILY
Qty: 60 TABLET | Refills: 5 | Status: SHIPPED | OUTPATIENT
Start: 2017-11-09 | End: 2018-01-16

## 2017-11-09 RX ORDER — CYCLOBENZAPRINE HCL 10 MG
10 TABLET ORAL NIGHTLY
Qty: 30 TABLET | Refills: 5 | Status: SHIPPED | OUTPATIENT
Start: 2017-11-09 | End: 2018-01-16

## 2017-11-09 NOTE — PROGRESS NOTES
Subjective:       Patient ID: Maurice Bran Jr. is a 41 y.o. male.    Chief Complaint: Medication Refill and Follow-up    Pt is a 41 y.o. male who presents for check up for Sacroiliac joint pain  Foraminal stenosis of cervical region  Osteoarthritis of spine with radiculopathy, cervical region  Chronic bilateral low back pain with bilateral sciatica  Chronic midline low back pain with left-sided sciatica  Neck pain. Doing well on current meds. Denies any side effects. Prevention is up to date.      Medication Refill   Pertinent negatives include no abdominal pain, arthralgias, chest pain, congestion, coughing, joint swelling, myalgias, nausea, neck pain, rash, sore throat or vomiting.     Review of Systems   Constitutional: Negative for appetite change.   HENT: Negative for congestion, ear pain, sneezing and sore throat.    Eyes: Negative for redness and visual disturbance.   Respiratory: Negative for cough, chest tightness and stridor.    Cardiovascular: Negative for chest pain.   Gastrointestinal: Negative for abdominal pain, blood in stool, diarrhea, nausea and vomiting.   Genitourinary: Negative for difficulty urinating, dysuria and hematuria.   Musculoskeletal: Positive for back pain. Negative for arthralgias, joint swelling, myalgias and neck pain.        Back with soreness   Skin: Negative for rash.   Neurological: Negative for dizziness.   Psychiatric/Behavioral: Negative for agitation. The patient is not nervous/anxious.        Objective:      Physical Exam   Constitutional: He is oriented to person, place, and time. He appears well-developed and well-nourished.   HENT:   Head: Normocephalic.   Eyes: Pupils are equal, round, and reactive to light.   Neck: Normal range of motion. Neck supple. No thyromegaly present.   Cardiovascular: Normal rate and regular rhythm.  Exam reveals no friction rub.    No murmur heard.  Pulmonary/Chest: Effort normal. No respiratory distress. He has no wheezes.    Abdominal: There is no tenderness. There is no rebound and no guarding.   Musculoskeletal: Normal range of motion. He exhibits no edema or tenderness.   Lymphadenopathy:     He has no cervical adenopathy.   Neurological: He is alert and oriented to person, place, and time. He has normal reflexes. No cranial nerve deficit.   Skin: Skin is warm and dry.   Psychiatric: He has a normal mood and affect. Judgment and thought content normal.       Assessment:       1. Sacroiliac joint pain    2. Foraminal stenosis of cervical region    3. Osteoarthritis of spine with radiculopathy, cervical region    4. Chronic bilateral low back pain with bilateral sciatica    5. Chronic midline low back pain with left-sided sciatica    6. Neck pain        Plan:   Maurice was seen today for medication refill and follow-up.    Diagnoses and all orders for this visit:    Sacroiliac joint pain  -     amitriptyline (ELAVIL) 10 MG tablet; Take 1 tablet (10 mg total) by mouth nightly as needed for Insomnia.    Foraminal stenosis of cervical region  -     amitriptyline (ELAVIL) 10 MG tablet; Take 1 tablet (10 mg total) by mouth nightly as needed for Insomnia.    Osteoarthritis of spine with radiculopathy, cervical region  -     amitriptyline (ELAVIL) 10 MG tablet; Take 1 tablet (10 mg total) by mouth nightly as needed for Insomnia.  -     clonazePAM (KLONOPIN) 1 MG tablet; Take 1 tablet (1 mg total) by mouth every evening.    Chronic bilateral low back pain with bilateral sciatica  -     amitriptyline (ELAVIL) 10 MG tablet; Take 1 tablet (10 mg total) by mouth nightly as needed for Insomnia.    Chronic midline low back pain with left-sided sciatica  -     cyclobenzaprine (FLEXERIL) 10 MG tablet; Take 1 tablet (10 mg total) by mouth every evening.    Neck pain  -     cyclobenzaprine (FLEXERIL) 10 MG tablet; Take 1 tablet (10 mg total) by mouth every evening.    Other orders  -     diclofenac (VOLTAREN) 75 MG EC tablet; Take 1 tablet (75 mg  total) by mouth 2 (two) times daily.    NO  Narcotics for this Patient--don't ask!!!!!!!!!!

## 2018-01-15 ENCOUNTER — TELEPHONE (OUTPATIENT)
Dept: SMOKING CESSATION | Facility: CLINIC | Age: 43
End: 2018-01-15

## 2018-01-15 NOTE — TELEPHONE ENCOUNTER
Spoke with patient to introduce the Smoking Cessation Program. Patient is not interested in joining the program at this time. Left patient with return contact information for when he is ready to quit.

## 2018-05-16 DIAGNOSIS — M47.22 OSTEOARTHRITIS OF SPINE WITH RADICULOPATHY, CERVICAL REGION: ICD-10-CM

## 2018-05-16 NOTE — TELEPHONE ENCOUNTER
----- Message from Judith Shankar sent at 2018  9:08 AM CDT -----  Contact: Self  Maurice Bran Jr.  MRN: [id number]  : 1975  PCP: Fam Eisenberg  Home Phone      [phone]  Work Phone      Not on file.  Mobile          [phone]      MESSAGE:   Pt requesting refill or new Rx.   Is this a refill or new RX:  refill  RX name and strength: clonazePAM (KLONOPIN) 1 MG tablet  Last office visit: 17  Is this a 30-day or 90-day RX:  30  Pharmacy name and location:  [facility]  Comments:  Patient said klonopins  and needs new pills      Phone:  [phone]

## 2018-05-17 RX ORDER — CLONAZEPAM 1 MG/1
TABLET ORAL
Qty: 30 TABLET | Refills: 5 | Status: SHIPPED | OUTPATIENT
Start: 2018-05-17 | End: 2018-11-26 | Stop reason: SDUPTHER

## 2018-06-01 ENCOUNTER — TELEPHONE (OUTPATIENT)
Dept: FAMILY MEDICINE | Facility: CLINIC | Age: 43
End: 2018-06-01

## 2018-06-01 NOTE — TELEPHONE ENCOUNTER
----- Message from Vaughn Maher sent at 2018 12:57 PM CDT -----  Contact: Girlfriend - Yeni Bashir Shant Martinez.  MRN: [id number]  : 1975  PCP: Fam Eisenberg  Home Phone      [phone]  Work Phone      Not on file.  Mobile          [phone]      MESSAGE: requesting refill on Klonopin - out of medication -- please route to a physician in clinic & advise of status -- uses [facility] ([address] in [place])    Call Yeni @ [phone]    PCP: [place]

## 2018-08-20 ENCOUNTER — TELEPHONE (OUTPATIENT)
Dept: FAMILY MEDICINE | Facility: CLINIC | Age: 43
End: 2018-08-20

## 2018-08-20 NOTE — TELEPHONE ENCOUNTER
[facility] state that a drug interaction  Has come up for Klonopin interacting with percocet is it ok to fill Klonopin.Please review and advise.Thank you

## 2018-08-22 NOTE — TELEPHONE ENCOUNTER
Spoke with [facility] pharmacist state they did not send anything or call.Please dis regard.

## 2018-09-24 ENCOUNTER — HOSPITAL ENCOUNTER (EMERGENCY)
Facility: HOSPITAL | Age: 43
Discharge: HOME | End: 2018-09-24
Attending: SURGERY
Payer: MEDICAID

## 2018-09-24 VITALS
WEIGHT: 149.06 LBS | OXYGEN SATURATION: 100 % | SYSTOLIC BLOOD PRESSURE: 120 MMHG | TEMPERATURE: 97 F | DIASTOLIC BLOOD PRESSURE: 72 MMHG | BODY MASS INDEX: 19.13 KG/M2 | RESPIRATION RATE: 16 BRPM | HEART RATE: 90 BPM

## 2018-09-24 DIAGNOSIS — L08.9 FINGER INFECTION: Primary | ICD-10-CM

## 2018-09-24 PROCEDURE — S0077 INJECTION, CLINDAMYCIN PHOSP: HCPCS | Performed by: SURGERY

## 2018-09-24 PROCEDURE — 99284 EMERGENCY DEPT VISIT MOD MDM: CPT | Mod: 25

## 2018-09-24 PROCEDURE — 87070 CULTURE OTHR SPECIMN AEROBIC: CPT

## 2018-09-24 PROCEDURE — 25000003 PHARM REV CODE 250: Performed by: SURGERY

## 2018-09-24 PROCEDURE — 96372 THER/PROPH/DIAG INJ SC/IM: CPT

## 2018-09-24 PROCEDURE — 10060 I&D ABSCESS SIMPLE/SINGLE: CPT

## 2018-09-24 PROCEDURE — 87077 CULTURE AEROBIC IDENTIFY: CPT

## 2018-09-24 PROCEDURE — 87186 SC STD MICRODIL/AGAR DIL: CPT

## 2018-09-24 RX ORDER — LIDOCAINE HYDROCHLORIDE 10 MG/ML
10 INJECTION, SOLUTION EPIDURAL; INFILTRATION; INTRACAUDAL; PERINEURAL
Status: COMPLETED | OUTPATIENT
Start: 2018-09-24 | End: 2018-09-24

## 2018-09-24 RX ORDER — MUPIROCIN 20 MG/G
1 OINTMENT TOPICAL
Status: COMPLETED | OUTPATIENT
Start: 2018-09-24 | End: 2018-09-24

## 2018-09-24 RX ORDER — MUPIROCIN 20 MG/G
OINTMENT TOPICAL 3 TIMES DAILY
Qty: 15 G | Refills: 0 | Status: SHIPPED | OUTPATIENT
Start: 2018-09-24 | End: 2018-10-04

## 2018-09-24 RX ORDER — HYDROCODONE BITARTRATE AND ACETAMINOPHEN 10; 325 MG/1; MG/1
1 TABLET ORAL EVERY 6 HOURS PRN
Qty: 20 TABLET | Refills: 0 | Status: SHIPPED | OUTPATIENT
Start: 2018-09-24 | End: 2018-10-04

## 2018-09-24 RX ORDER — CLINDAMYCIN HYDROCHLORIDE 300 MG/1
300 CAPSULE ORAL 4 TIMES DAILY
Qty: 28 CAPSULE | Refills: 0 | Status: SHIPPED | OUTPATIENT
Start: 2018-09-24 | End: 2018-10-01

## 2018-09-24 RX ORDER — CLINDAMYCIN PHOSPHATE 150 MG/ML
600 INJECTION, SOLUTION INTRAVENOUS
Status: COMPLETED | OUTPATIENT
Start: 2018-09-24 | End: 2018-09-24

## 2018-09-24 RX ADMIN — LIDOCAINE HYDROCHLORIDE 100 MG: 10 INJECTION, SOLUTION EPIDURAL; INFILTRATION; INTRACAUDAL; PERINEURAL at 12:09

## 2018-09-24 RX ADMIN — MUPIROCIN 22 G: 20 OINTMENT TOPICAL at 12:09

## 2018-09-24 RX ADMIN — CLINDAMYCIN PHOSPHATE 600 MG: 150 INJECTION, SOLUTION INTRAMUSCULAR; INTRAVENOUS at 12:09

## 2018-09-24 NOTE — ED PROVIDER NOTES
[facility]                                                 Chief Complaint  42 y.o. male with skin infection    History of Present Illness  Maurice Bordenrubén Martinez. presents to the emergency room with finger infection  Patient has an infection the right dorsal 5th finger, insect bite is now infected  Patient on exam has a 1 x 2 centimeter infection on the right dorsal 5th finger  Patient has minor drainage, minimal fluctuance, no cellulitic spread on exam  Patient had a infection in that hand in 2017, was drained by [facility]  Patient is neurovascular intact in tetanus is up-to-date, this will need to be drained    The history is provided by the patient   device was not used during this ER visit  Medical history: Back pain, fractures, heart murmur, penile cyst  Surgeries: Excision of penile cyst, tonsillectomy  No Known Allergies     Review of Systems and Physical Exam      Review of Systems  -- Constitution - no fever, denies fatigue, no weakness, no chills  -- Eyes - no tearing or redness, no visual disturbance  -- Ear, Nose - no tinnitus or earache, no nasal congestion or discharge  -- Mouth,Throat - no sore throat, no toothache, normal voice, normal swallowing  -- Respiratory - denies cough and congestion, no shortness of breath, no ERICKSON  -- Cardiovascular - denies chest pain, no palpitations, denies claudication  -- Gastrointestinal - denies abdominal pain, nausea, vomiting, or diarrhea  -- Genitourinary - no dysuria, denies flank pain, no hematuria, no STD risk  -- Musculoskeletal - denies back pain, negative for myalgias and arthralgias   -- Neurological - no headache, denies weakness or seizure; no LOC  -- Skin - infection on the right 5th finger    Vital Signs  His oral temperature is 97 °F (36.1 °C).   His blood pressure is 120/72 and his pulse is 90.   His respiration is 16 and oxygen saturation is 100%.     Physical Exam  -- Nursing note and vitals  reviewed  -- Constitutional: Appears well-developed and well-nourished  -- Head: Atraumatic. Normocephalic. No obvious abnormality  -- Eyes: Pupils are equal and reactive to light. Normal conjunctiva and lids  -- Cardiac: Normal rate, regular rhythm and normal heart sounds  -- Pulmonary: Normal respiratory effort, breath sounds clear to auscultation  -- Abdominal: Soft, no tenderness. Normal bowel sounds. Normal liver edge  -- Musculoskeletal: Normal range of motion, no effusions. Joints stable   -- Neurological: No focal deficits. Showed good interaction with staff  -- Vascular: Posterior tibial, dorsalis pedis and radial pulses 2+ bilaterally    -- Lymphatics: No cervical or peripheral lymphadenopathy. No edema noted  -- Skin:  2 x 3 centimeter area of infection of the base of the right 5th finger    Emergency Room Course          I & D - Incision and Drainage  -- Performed by: Luciano Larson M.D.  -- Date/Time: 1:16 PM 9/24/2018    -- Type: abscess  -- Location: Right 5th finger  -- Anesthesia: local infiltration  -- Local anesthetic: lidocaine 1%   -- Anesthetic total: 10 ml  -- Scalpel size: 11  -- Incision type: single straight  -- Complexity: simple  -- Drainage: pus  -- Drainage amount: scant  -- Wound treatment: incision  -- Irrigated with hydrogen peroxide and sterile water  -- Packing material: 1/4 in gauze  -- Wound culture taken   -- Ambulatory referral sent to [facility]     Medications Given  Tdap vaccine injection 0.5 mL (not administered)   clindamycin injection 600 mg (600 mg Intramuscular Given 9/24/18 1239)   lidocaine (PF) 10 mg/ml (1%) injection 100 mg (100 mg Infiltration Given 9/24/18 1240)   mupirocin 2 % ointment 22 g (22 g Topical (Top) Given 9/24/18 1241)     Diagnosis  -- The encounter diagnosis was Finger infection.    Disposition and Plan  -- Disposition: home  -- Condition: stable  -- Follow-up: Patient to follow up with orthopedics in 1-2 days.  -- I advised the patient  that we have found no life threatening condition today  -- At this time, I believe the patient is clinically stable for discharge.   -- The patient acknowledges that close follow up with a MD is required   -- Patient agrees to comply with all instruction and direction given in the ER    This note is dictated on Dragon Natural Speaking word recognition program.  There are word recognition mistakes that are occasionally missed on review.         Luciano Larson MD  09/24/18 6922

## 2018-09-27 LAB — BACTERIA SPEC AEROBE CULT: NORMAL

## 2018-10-10 ENCOUNTER — TELEPHONE (OUTPATIENT)
Dept: FAMILY MEDICINE | Facility: CLINIC | Age: 43
End: 2018-10-10

## 2018-10-10 NOTE — TELEPHONE ENCOUNTER
----- Message from Vaughn Maher sent at 10/10/2018 11:46 AM CDT -----  Contact: Jose @ [facility] ([facility])  Maurice Bordenrubén Martinez.  MRN: [id number]  : 1975  PCP: Fam Eisenberg  Home Phone      [phone]  Work Phone      Not on file.  Mobile          [phone]      MESSAGE: questioning refill on Clonazepam - will cause drug interaction -- please advise    Call Jose @[phone]    PCP: [place]

## 2018-10-11 NOTE — TELEPHONE ENCOUNTER
Spoke to fredis and he stated that pt is refilling [place] that is getting prescribed by a Tony Blackman.   Hes calling to make sure that he can fill the klonopin since pt is taking norco. Please advise,thank you

## 2018-11-19 ENCOUNTER — TELEPHONE (OUTPATIENT)
Dept: FAMILY MEDICINE | Facility: CLINIC | Age: 43
End: 2018-11-19

## 2018-11-19 NOTE — TELEPHONE ENCOUNTER
Informed mother that patient needs an appt.  Verbalized understanding and states that she will have him call to schedule.

## 2018-11-19 NOTE — TELEPHONE ENCOUNTER
----- Message from Vaughn Maher sent at 2018 12:47 PM CST -----  Contact: Dedra Estrada Berenice Bashir Shant Martinez.  MRN: [id number]  : 1975  PCP: Fam Eisenberg  Home Phone      [phone]  Work Phone      Not on file.  [place]          [phone]      MESSAGE: requesting refill on Klonopin -- wants written Rx    Call [phone]    PCP: [place]

## 2018-11-20 ENCOUNTER — TELEPHONE (OUTPATIENT)
Dept: FAMILY MEDICINE | Facility: CLINIC | Age: 43
End: 2018-11-20

## 2018-11-20 NOTE — TELEPHONE ENCOUNTER
----- Message from Vaughn Maher sent at 2018 10:16 AM CST -----  Contact: Wife - Radha Bran Jr.  MRN: [id number]  : 1975  PCP: Fam Eisenberg  Home Phone      [phone]  Work Phone      Not on file.  Mobile          [phone]      MESSAGE: needs appt for meds -- requesting ASAP    Call [phone]    PCP: [place]

## 2018-11-26 ENCOUNTER — OFFICE VISIT (OUTPATIENT)
Dept: FAMILY MEDICINE | Facility: CLINIC | Age: 43
End: 2018-11-26
Payer: MEDICAID

## 2018-11-26 VITALS
RESPIRATION RATE: 18 BRPM | HEART RATE: 84 BPM | HEIGHT: 74 IN | DIASTOLIC BLOOD PRESSURE: 80 MMHG | SYSTOLIC BLOOD PRESSURE: 110 MMHG | BODY MASS INDEX: 21.71 KG/M2

## 2018-11-26 DIAGNOSIS — F41.1 GAD (GENERALIZED ANXIETY DISORDER): Primary | ICD-10-CM

## 2018-11-26 DIAGNOSIS — M47.22 OSTEOARTHRITIS OF SPINE WITH RADICULOPATHY, CERVICAL REGION: ICD-10-CM

## 2018-11-26 PROCEDURE — 99213 OFFICE O/P EST LOW 20 MIN: CPT | Mod: S$PBB,,, | Performed by: FAMILY MEDICINE

## 2018-11-26 PROCEDURE — 99999 PR PBB SHADOW E&M-EST. PATIENT-LVL III: CPT | Mod: PBBFAC,,, | Performed by: FAMILY MEDICINE

## 2018-11-26 PROCEDURE — 99213 OFFICE O/P EST LOW 20 MIN: CPT | Mod: PBBFAC | Performed by: FAMILY MEDICINE

## 2018-11-26 RX ORDER — CLONAZEPAM 1 MG/1
TABLET ORAL
Qty: 30 TABLET | Refills: 5 | Status: SHIPPED | OUTPATIENT
Start: 2018-11-26

## 2018-11-26 NOTE — PROGRESS NOTES
Subjective:       Patient ID: Maurice Bran Jr. is a 42 y.o. male.    Chief Complaint: Medication Problem    Pt is a 42 y.o. male who presents for check up for YUDY from Mx medical issues. Doing well on current meds. Denies any side effects. Prevention is up to date.      Review of Systems   Constitutional: Negative for appetite change.   HENT: Negative for congestion, ear pain, sneezing and sore throat.    Eyes: Negative for redness and visual disturbance.   Respiratory: Negative for cough, chest tightness and stridor.    Cardiovascular: Negative for chest pain.   Gastrointestinal: Negative for abdominal pain, blood in stool, diarrhea, nausea and vomiting.   Genitourinary: Negative for difficulty urinating, dysuria and hematuria.   Musculoskeletal: Positive for arthralgias. Negative for back pain, joint swelling, myalgias and neck pain.        R ankle with a chronic issue   Skin: Negative for rash.   Neurological: Negative for dizziness.   Psychiatric/Behavioral: Negative for agitation. The patient is nervous/anxious.        Objective:      Physical Exam   Constitutional: He is oriented to person, place, and time. He appears well-developed and well-nourished.   HENT:   Head: Normocephalic.   Eyes: Pupils are equal, round, and reactive to light.   Neck: Normal range of motion. Neck supple. No thyromegaly present.   Cardiovascular: Normal rate and regular rhythm. Exam reveals no friction rub.   No murmur heard.  Pulmonary/Chest: Effort normal. No respiratory distress. He has no wheezes.   Abdominal: There is no tenderness. There is no rebound and no guarding.   Musculoskeletal: Normal range of motion. He exhibits no edema or tenderness.   Lymphadenopathy:     He has no cervical adenopathy.   Neurological: He is alert and oriented to person, place, and time. He has normal reflexes. No cranial nerve deficit.   Skin: Skin is warm and dry.   Psychiatric: He has a normal mood and affect. Judgment and thought content  normal.       Assessment:       1. YUDY (generalized anxiety disorder)    2. Osteoarthritis of spine with radiculopathy, cervical region        Plan:   Maurice was seen today for medication problem.    Diagnoses and all orders for this visit:    YUDY (generalized anxiety disorder)    Osteoarthritis of spine with radiculopathy, cervical region  -     clonazePAM (KLONOPIN) 1 MG tablet; TAKE ONE TABLET BY MOUTH ONCE DAILY IN THE EVENING

## 2018-11-28 ENCOUNTER — TELEPHONE (OUTPATIENT)
Dept: FAMILY MEDICINE | Facility: CLINIC | Age: 43
End: 2018-11-28

## 2018-11-28 NOTE — TELEPHONE ENCOUNTER
----- Message from Vaughn Maher sent at 2018 10:14 AM CST -----  Contact: Patient  Maurice Bran Jr.  MRN: [id number]  : 1975  PCP: Fam Eisenberg  Home Phone      [phone]  Work Phone      Not on file.  Mobile          [phone]      MESSAGE: [facility] states [facility] can not fill Kl[facility]opin without more information from the Dr -- please contact [facility] ([address]) -- patient states he needs medication today    Call Patient @ [phone]    PCP: [place]

## 2018-11-28 NOTE — TELEPHONE ENCOUNTER
Patient seen in office on 11/27/2018. Pharmacy ([facility] in [place]/ Shai/pharmacist) unable to fill Klonopin. States patient has medications ordered from 6 other providers in the past 2 years.   Most recently has Ultracet ordered per Dr. Tony Blackman (ortho specialist).     Also states patient has filled different medications at several other pharmacies.     States they will not fill Klonopin, states they will give patient back hard copy of Ultracet and will cancel Klonopin prescription.    *Requesting to have [facility] to [facility] on [address] in [place].    Phone: [phone]    [facility]: [facility]

## 2018-11-28 NOTE — TELEPHONE ENCOUNTER
Spoke with nurse stephane medication was sent to  she did speak with patient to inform patient that Dr. Eisenberg is out of the office today.Attempt to contact patient nvm.

## 2019-07-08 ENCOUNTER — HOSPITAL ENCOUNTER (EMERGENCY)
Facility: HOSPITAL | Age: 44
Discharge: HOME | End: 2019-07-08
Attending: INTERNAL MEDICINE
Payer: MEDICAID

## 2019-07-08 VITALS
HEART RATE: 87 BPM | TEMPERATURE: 98 F | DIASTOLIC BLOOD PRESSURE: 68 MMHG | BODY MASS INDEX: 20.79 KG/M2 | OXYGEN SATURATION: 99 % | WEIGHT: 161.94 LBS | SYSTOLIC BLOOD PRESSURE: 112 MMHG | RESPIRATION RATE: 20 BRPM

## 2019-07-08 DIAGNOSIS — R52 PAIN: ICD-10-CM

## 2019-07-08 DIAGNOSIS — S39.012A LUMBAR STRAIN, INITIAL ENCOUNTER: ICD-10-CM

## 2019-07-08 DIAGNOSIS — S39.012A LOW BACK STRAIN, INITIAL ENCOUNTER: Primary | ICD-10-CM

## 2019-07-08 LAB
ALBUMIN SERPL BCP-MCNC: 3.3 G/DL (ref 3.5–5.2)
ALP SERPL-CCNC: 64 U/L (ref 55–135)
ALT SERPL W/O P-5'-P-CCNC: 11 U/L (ref 10–44)
ANION GAP SERPL CALC-SCNC: 9 MMOL/L (ref 8–16)
AST SERPL-CCNC: 11 U/L (ref 10–40)
BASOPHILS # BLD AUTO: 0.04 K/UL (ref 0–0.2)
BASOPHILS NFR BLD: 0.4 % (ref 0–1.9)
BILIRUB SERPL-MCNC: 0.3 MG/DL (ref 0.1–1)
BILIRUB UR QL STRIP: NEGATIVE
BUN SERPL-MCNC: 15 MG/DL (ref 6–20)
CALCIUM SERPL-MCNC: 9.2 MG/DL (ref 8.7–10.5)
CHLORIDE SERPL-SCNC: 107 MMOL/L (ref 95–110)
CLARITY UR: CLEAR
CO2 SERPL-SCNC: 24 MMOL/L (ref 23–29)
COLOR UR: YELLOW
CREAT SERPL-MCNC: 0.8 MG/DL (ref 0.5–1.4)
DIFFERENTIAL METHOD: ABNORMAL
EOSINOPHIL # BLD AUTO: 0.3 K/UL (ref 0–0.5)
EOSINOPHIL NFR BLD: 2.8 % (ref 0–8)
ERYTHROCYTE [DISTWIDTH] IN BLOOD BY AUTOMATED COUNT: 14.1 % (ref 11.5–14.5)
EST. GFR  (AFRICAN AMERICAN): >60 ML/MIN/1.73 M^2
EST. GFR  (NON AFRICAN AMERICAN): >60 ML/MIN/1.73 M^2
GLUCOSE SERPL-MCNC: 107 MG/DL (ref 70–110)
GLUCOSE UR QL STRIP: NEGATIVE
HCT VFR BLD AUTO: 40.2 % (ref 40–54)
HGB BLD-MCNC: 13.6 G/DL (ref 14–18)
HGB UR QL STRIP: NEGATIVE
IMM GRANULOCYTES # BLD AUTO: 0.03 K/UL (ref 0–0.04)
IMM GRANULOCYTES NFR BLD AUTO: 0.3 % (ref 0–0.5)
KETONES UR QL STRIP: NEGATIVE
LEUKOCYTE ESTERASE UR QL STRIP: NEGATIVE
LYMPHOCYTES # BLD AUTO: 2.6 K/UL (ref 1–4.8)
LYMPHOCYTES NFR BLD: 25.1 % (ref 18–48)
MCH RBC QN AUTO: 29.4 PG (ref 27–31)
MCHC RBC AUTO-ENTMCNC: 33.8 G/DL (ref 32–36)
MCV RBC AUTO: 87 FL (ref 82–98)
MONOCYTES # BLD AUTO: 0.8 K/UL (ref 0.3–1)
MONOCYTES NFR BLD: 7.6 % (ref 4–15)
NEUTROPHILS # BLD AUTO: 6.7 K/UL (ref 1.8–7.7)
NEUTROPHILS NFR BLD: 63.8 % (ref 38–73)
NITRITE UR QL STRIP: NEGATIVE
NRBC BLD-RTO: 0 /100 WBC
PH UR STRIP: 6 [PH] (ref 5–8)
PLATELET # BLD AUTO: 258 K/UL (ref 150–350)
PMV BLD AUTO: 9.8 FL (ref 9.2–12.9)
POTASSIUM SERPL-SCNC: 3.7 MMOL/L (ref 3.5–5.1)
PROT SERPL-MCNC: 6.6 G/DL (ref 6–8.4)
PROT UR QL STRIP: NEGATIVE
RBC # BLD AUTO: 4.63 M/UL (ref 4.6–6.2)
SODIUM SERPL-SCNC: 140 MMOL/L (ref 136–145)
SP GR UR STRIP: >=1.03 (ref 1–1.03)
URN SPEC COLLECT METH UR: ABNORMAL
UROBILINOGEN UR STRIP-ACNC: NEGATIVE EU/DL
WBC # BLD AUTO: 10.49 K/UL (ref 3.9–12.7)

## 2019-07-08 PROCEDURE — 80053 COMPREHEN METABOLIC PANEL: CPT

## 2019-07-08 PROCEDURE — 81003 URINALYSIS AUTO W/O SCOPE: CPT

## 2019-07-08 PROCEDURE — 25000003 PHARM REV CODE 250: Performed by: INTERNAL MEDICINE

## 2019-07-08 PROCEDURE — 63600175 PHARM REV CODE 636 W HCPCS: Performed by: INTERNAL MEDICINE

## 2019-07-08 PROCEDURE — 96372 THER/PROPH/DIAG INJ SC/IM: CPT

## 2019-07-08 PROCEDURE — 36415 COLL VENOUS BLD VENIPUNCTURE: CPT

## 2019-07-08 PROCEDURE — 85025 COMPLETE CBC W/AUTO DIFF WBC: CPT

## 2019-07-08 PROCEDURE — 99285 EMERGENCY DEPT VISIT HI MDM: CPT | Mod: 25

## 2019-07-08 RX ORDER — ONDANSETRON 4 MG/1
4 TABLET, ORALLY DISINTEGRATING ORAL
Status: COMPLETED | OUTPATIENT
Start: 2019-07-08 | End: 2019-07-08

## 2019-07-08 RX ORDER — CYCLOBENZAPRINE HCL 10 MG
10 TABLET ORAL 3 TIMES DAILY PRN
Qty: 15 TABLET | Refills: 0 | Status: SHIPPED | OUTPATIENT
Start: 2019-07-08 | End: 2019-07-13

## 2019-07-08 RX ORDER — HYDROMORPHONE HYDROCHLORIDE 1 MG/ML
0.5 INJECTION, SOLUTION INTRAMUSCULAR; INTRAVENOUS; SUBCUTANEOUS
Status: COMPLETED | OUTPATIENT
Start: 2019-07-08 | End: 2019-07-08

## 2019-07-08 RX ORDER — NAPROXEN 375 MG/1
375 TABLET ORAL 2 TIMES DAILY WITH MEALS
Qty: 30 TABLET | Refills: 0 | Status: SHIPPED | OUTPATIENT
Start: 2019-07-08 | End: 2019-07-08 | Stop reason: SDUPTHER

## 2019-07-08 RX ORDER — NAPROXEN 375 MG/1
375 TABLET ORAL 2 TIMES DAILY WITH MEALS
Qty: 30 TABLET | Refills: 0 | Status: SHIPPED | OUTPATIENT
Start: 2019-07-08

## 2019-07-08 RX ORDER — KETOROLAC TROMETHAMINE 30 MG/ML
60 INJECTION, SOLUTION INTRAMUSCULAR; INTRAVENOUS
Status: COMPLETED | OUTPATIENT
Start: 2019-07-08 | End: 2019-07-08

## 2019-07-08 RX ORDER — DEXAMETHASONE SODIUM PHOSPHATE 4 MG/ML
4 INJECTION, SOLUTION INTRA-ARTICULAR; INTRALESIONAL; INTRAMUSCULAR; INTRAVENOUS; SOFT TISSUE
Status: COMPLETED | OUTPATIENT
Start: 2019-07-08 | End: 2019-07-08

## 2019-07-08 RX ORDER — CYCLOBENZAPRINE HCL 10 MG
10 TABLET ORAL 3 TIMES DAILY PRN
Qty: 15 TABLET | Refills: 0 | Status: SHIPPED | OUTPATIENT
Start: 2019-07-08 | End: 2019-07-08 | Stop reason: SDUPTHER

## 2019-07-08 RX ADMIN — DEXAMETHASONE SODIUM PHOSPHATE 4 MG: 4 INJECTION, SOLUTION INTRAMUSCULAR; INTRAVENOUS at 08:07

## 2019-07-08 RX ADMIN — HYDROMORPHONE HYDROCHLORIDE 0.5 MG: 1 INJECTION, SOLUTION INTRAMUSCULAR; INTRAVENOUS; SUBCUTANEOUS at 08:07

## 2019-07-08 RX ADMIN — ONDANSETRON 4 MG: 4 TABLET, ORALLY DISINTEGRATING ORAL at 08:07

## 2019-07-08 RX ADMIN — KETOROLAC TROMETHAMINE 60 MG: 30 INJECTION, SOLUTION INTRAMUSCULAR; INTRAVENOUS at 08:07

## 2019-07-08 NOTE — ED PROVIDER NOTES
Encounter Date: 7/8/2019       History     Chief Complaint   Patient presents with    Back Pain     lower back pain     Pt c/o back pain for the past week. The pain is located in the lower thoracic and upper lumbar area, is sharp and worse with bending. The patient states that a few days ago he lifted an air conditioner on top the back of a truck and he feels this may have caused it. He has had no LE weakness, paresthesias, or fever.        Review of patient's allergies indicates:  No Known Allergies  Past Medical History:   Diagnosis Date    Back pain     Fractures     Heart murmur     Penile cyst      Past Surgical History:   Procedure Laterality Date    EXCISION-CYST N/A 4/20/2016    Performed by Mateo Valerio II, MD at [facility] [place]    TONSILLECTOMY       Family History   Problem Relation Age of Onset    Heart disease Father     No Known Problems Mother     Cancer Brother         bone     Social History     Tobacco Use    Smoking status: Light Tobacco Smoker     Packs/day: 0.25     Years: 20.00     Pack years: 5.00     Types: Cigarettes    Smokeless tobacco: Never Used   Substance Use Topics    Alcohol use: No     Alcohol/week: 0.0 oz    Drug use: No     Types: Methamphetamines, Cocaine     Comment: PAST HX. PATIENT QUIT 4 YEARS AGO.     Review of Systems   All other systems reviewed and are negative.      Physical Exam     Initial Vitals [07/08/19 0540]   BP Pulse Resp Temp SpO2   112/68 87 20 97.9 °F (36.6 °C) 99 %      MAP       --         Physical Exam    Nursing note and vitals reviewed.  Constitutional: He appears well-developed and well-nourished.   HENT:   Head: Normocephalic and atraumatic.   Nose: Nose normal.   Eyes: Conjunctivae and EOM are normal. Pupils are equal, round, and reactive to light.   Neck: Normal range of motion. Neck supple.   Cardiovascular: Normal rate, regular rhythm, normal heart sounds and intact distal pulses. Exam reveals no gallop and no friction rub.    No murmur  heard.  Pulmonary/Chest: Breath sounds normal.   Abdominal: Soft. Bowel sounds are normal.   Musculoskeletal: Normal range of motion.   Marked tenderness in the T12-L3 paraspinous area on palpation.   Neurological: He is alert and oriented to person, place, and time. He has normal strength and normal reflexes. No cranial nerve deficit.   Skin: Skin is warm and dry. Capillary refill takes less than 2 seconds.         ED Course   Procedures  Labs Reviewed   CBC W/ AUTO DIFFERENTIAL - Abnormal; Notable for the following components:       Result Value    Hemoglobin 13.6 (*)     All other components within normal limits   COMPREHENSIVE METABOLIC PANEL - Abnormal; Notable for the following components:    Albumin 3.3 (*)     All other components within normal limits   URINALYSIS - Abnormal; Notable for the following components:    Specific Gravity, UA >=1.030 (*)     All other components within normal limits          Imaging Results          CT Lumbar Spine Without Contrast (In process)                CT Thoracic Spine Without Contrast (In process)                X-Ray Thoracolumbar Spine AP Lateral (In process)                X-Ray Chest PA And Lateral (Preliminary result)  Result time 07/08/19 07:09:16    ED Interpretation by Lona Maldonado MD (07/08/19 07:09:16, [facility], Emergency Medicine)                                X-Rays:   Independently Interpreted Readings:   Other Readings:  CXR- no infiltrates or effusion; heart size normal.    Thoracolumbar spine Xray: Mild scoliosis of throcaolumbar area with mild degenerative changes; no compression fracture.    Medical Decision Making:   Initial Assessment:   Acute lower thoracic and lumbar back pain x 1 week    Differential Diagnosis:   Back strain  Scoliosis of lumbar spine  Mild degenerative disc disease.  Clinical Tests:   Lab Tests: Ordered and Reviewed  Radiological Study: Ordered and Reviewed                      Clinical Impression:        ICD-10-CM ICD-9-CM   1. Low back strain, initial encounter S39.012A 847.2   2. Pain R52 780.96   3. Lumbar strain, initial encounter S39.012A 847.2         Disposition:   Disposition: Discharged  Condition: Stable                        Lona Maldonado MD  07/09/19 [id number]

## 2022-07-07 DIAGNOSIS — S72.142A CLOSED DISPLACED INTERTROCHANTERIC FRACTURE OF LEFT FEMUR, INITIAL ENCOUNTER: Primary | ICD-10-CM

## 2022-07-21 ENCOUNTER — CLINICAL SUPPORT (OUTPATIENT)
Dept: REHABILITATION | Facility: HOSPITAL | Age: 47
End: 2022-07-21
Payer: MEDICAID

## 2022-07-21 DIAGNOSIS — R26.9 GAIT ABNORMALITY: ICD-10-CM

## 2022-07-21 DIAGNOSIS — S72.142A CLOSED DISPLACED INTERTROCHANTERIC FRACTURE OF LEFT FEMUR, INITIAL ENCOUNTER: ICD-10-CM

## 2022-07-21 DIAGNOSIS — Z98.890 STATUS POST OPEN REDUCTION AND INTERNAL FIXATION (ORIF) OF FRACTURE: Primary | ICD-10-CM

## 2022-07-21 DIAGNOSIS — Z87.81 STATUS POST OPEN REDUCTION AND INTERNAL FIXATION (ORIF) OF FRACTURE: Primary | ICD-10-CM

## 2022-07-21 PROCEDURE — 97110 THERAPEUTIC EXERCISES: CPT | Mod: PN

## 2022-07-21 PROCEDURE — 97161 PT EVAL LOW COMPLEX 20 MIN: CPT | Mod: PN

## 2022-07-21 NOTE — PLAN OF CARE
[facility]   Physical Therapy Initial Evaluation     Date: 7/21/2022   Name: Maurice Bran .  Clinic Number: [id number]    Therapy Diagnosis:   Encounter Diagnoses   Name Primary?    Status post open reduction and internal fixation (ORIF) of fracture Yes    Gait abnormality     Closed displaced intertrochanteric fracture of left femur, initial encounter      Physician: Sj Greenberg PA    Physician Orders: PT Eval and Treat  Medical Diagnosis from Referral: S72.142A (ICD-10-CM) - Closed displaced intertrochanteric fracture of left femur, initial encounter  Evaluation Date: 7/21/2022  Authorization Period Expiration: 8/1/2022 (eval)  Plan of Care Expiration: 9/23/22  Progress Note Due: 8/21/22  Visit # / Visits authorized: 1/1 (eval)    Precautions: Standard and s/p ORIF L Hip    Time In: 8:45 AM  Time Out: 9:30 AM  Total Appointment Time (timed & untimed codes): 45 minutes      SUBJECTIVE     History of current condition - Maurice reports: approximately 2.5 months ago, he experienced a mechanical fall when navigating a boat dock and stepped on rotten board, losing his balance and fell. He states he fell off of the dock and onto the rocks below, to which patient experienced L intertrochanteric fracture of left femur. Patient required helicopter to attend ER for medical attention. Patient completed ORIF IM Nail of left femur on 5/16/2022 to secure femur in place. After completing ER visit and being discharged post-operatively, patient reports he maintained NWB status until completing f/u with doctor. Patient states he has utilized a rolling walker as AD since the surgery and reports no history of completing home health PT post-operatively. After completing f/u with doctor, patient received referral for outpatient PT and advised to perform WBAT onto LLE to facilitate improvement with navigating in his environment. Patient reports he currently takes ibuprofen prn for  "medication, with report of previously taking hydrocodone post-operatively.     Falls: traumatic fall experienced on 5/15/22; no other fall hx to report    Imaging: Maurice has completed the following imaging. Findings are as follows:    - [facility] XR FEMUR 2 VW LEFT, [facility] XR HIP 2-3 VW LEFT. On 7/6/2022.   "FINDINGS: Left femoral intramedullary nail demonstrates no evidence of loosening or failure. The intertrochanteric fracture fragments are unchanged in position and alignment. There is increasing callus formation around the fracture planes. There is no evidence of new osseous abnormality.    IMPRESSION:   Healing fracture(s). No evident hardware complication."    Prior Therapy: No previous hx of PT to report.  Social History: Currently resides in a single story home with 4 steps to enter, lives with friend Jeanie. Reports completing stairs with step-to pattern.   Occupation: unemployed  Leisure Activities: Shrimping, no other activities to report  Prior Level of Function: Independent with ADLs  Current Level of Function: Independent with ADLs, however requires increased time. After surgery, patient reports he would lift his LLE into the tub; however now is capable of compelting transfer while standing. Reports utilizing rolling walker 24/7. Unable to fully WB onto LLE and complete reciprocal gait pattern. Reports he "hops" on RLE if he doesn't have walker. Experiences increased cramping of LLE in the morning and while completing increased activity.     Pain:  Current 0/10, worst 9/10, best 0/10   Location: LLE, upper lateral thigh and knee  Description: Aching, pinching, pulling  Aggravating Factors: Refer above  Easing Factors: Ibuprofen prn and rest    Patients goals: To decrease pain levels and return to prior level of function. Patient would also like to improve ability to walk, with hopes of utilizing no AD in the near future.      Medical History:   Past Medical History:   Diagnosis Date    Back pain     " Fractures     Heart murmur     Penile cyst        Surgical History:   Maurice Bran Jr.  has a past surgical history that includes Tonsillectomy.    Medications:   Maurice has a current medication list which includes the following prescription(s): albuterol, clonazepam, doxycycline, escitalopram oxalate, naproxen, naproxen, sertraline, tizanidine, and varenicline, and the following Facility-Administered Medications: lidocaine hcl 10 mg/ml (1%).    Allergies:   Review of patient's allergies indicates:  No Known Allergies       OBJECTIVE     Observation: pleasant and compliant patient    Posture: flatback t/o spine, espeically with decreased lumbar lordosis. Increased WB status onto RLE in standing, with WBAT onto LLE.    Gait: utilizes rolling walker with performing step-to gait with RLE leading.     Range of Motion:     Knee Right active Right Passive Left Active Left passive   Flexion 140 140 125 130   Extension 0 0 -5 -5*     Hip Right active Right Passive Left active  Left Passive   Flexion 140 140 125 130   Abduction 40  20    Extension       Ext. Rotation       Int. Rotation       All fields blank left intentionally and signify 'not tested'    * = increased pain when performing ROM at end-range    Lower Extremity Strength:    Right LE  Left LE    Knee extension: 4/5 Knee extension: 3+/5   Knee flexion: 4/5 Knee flexion: 3+/5   Hip flexion: 4-/5 Hip flexion: 3/5   Hip extension:  3+/5 Hip extension: 3/5   Hip abduction: 4/5 Hip abduction: 3/5   Hip adduction: 4/5 Hip adduction 3+/5   Hip IR: 3+/5 Hip IR: 3/5   Hip ER: 3+/5 Hip ER: 3/5   Ankle dorsiflexion: 4/5 Ankle dorsiflexion: 4/5   Ankle plantarflexion: 4/5 Ankle plantarflexion: 4/5     * = increased pain when performing MMT movement    Special Tests:    FABERs: (+) LLE, with observed increased muscular guarding throughout completing test  Hip Scour: (+) LLE with report of minor discomfort    Functional tests:     DL Squat: decreased full hip and  "knee extension at end range  SLS EO: Not assessed today; will complete once full WB status is achieved on LLE  SLS EC: Not assessed today; will complete once full WB status is achieved on LLE  5 Timed Sit to Stands: 16s with BUE support on armrests of chair  TUs with RW and BUE support on armrests of chair    Palpation: increased muscular guarding of TFL, piriformis, glute musculature, as expected post-op    Sensation: intact to light touch    Limitation/Restriction for FOTO Survey    FOTO was not completed during today's visit. Will complete during a future visit.          TREATMENT     Total Treatment time (time-based codes) separate from Evaluation: 8 minutes      Maurice received the treatments listed below:       THERAPEUTIC EXERCISES to develop strength, endurance, ROM and flexibility for 8 minutes including :  o Quad sets, 2x10 with 5" holds  o Supine hip abduction, 2x10  o Seated Hamstring stretch, 3x30"    PATIENT EDUCATION AND HOME EXERCISES     Education provided:   - Current medical status and being a candidate for skilled therapeutic intervention, HEP and importance of compliance, Role of PT, general joint arthrokinematics of hip and knee, Relevant anatomy, goals of POC, general principles of well-being.    Written Home Exercises Provided: yes. Exercises were reviewed and Maurice was able to demonstrate them prior to the end of the session.  Maurice demonstrated good  understanding of the education provided. See EMR under Patient Instructions for exercises provided during therapy sessions.    ASSESSMENT     Maurice is a 46 y.o. male referred to outpatient Physical Therapy with a medical diagnosis of closed displaced intertrochanteric fracture of left femur, initial encounter. Patient presents with inability to perform ADLs independently due to difficulty performing proper gait, bending, lifting, squatting, walking/standing for extended periods of time and performing transfers secondary to " limitations in strength, ROM, functional mobility, and increased pain levels in the presence of signs and symptoms consistent with post-op ORIF intermedullary nail of L femur. Plan to initiate skilled therapeutic intervention via improving patient's general knee and hip AROM, as well as improve overall functional strength and WB status onto LLE to facilitate proper gait biomechanics.     Patient prognosis is Good.   Patientt will benefit from skilled outpatient Physical Therapy to address the deficits stated above and in the chart below, provide patient /family education, and to maximize patientt's level of independence.     Plan of care discussed with patient: Yes  Patient's spiritual, cultural and educational needs considered and patient is agreeable to the plan of care and goals as stated below:     Anticipated Barriers for therapy: None at this time.     Medical Necessity is demonstrated by the following  History  Co-morbidities and personal factors that may impact the plan of care Co-morbidities:   See medical hx    Personal Factors:   no deficits     low   Examination  Body Structures and Functions, activity limitations and participation restrictions that may impact the plan of care Body Regions:   lower extremities  trunk    Body Systems:    gross symmetry  ROM  strength  gross coordinated movement  balance  gait  transfers  transitions    Participation Restrictions:   Completing ADLs independently    Activity limitations:   Learning and applying knowledge  no deficits    General Tasks and Commands  no deficits    Communication  no deficits    Mobility  lifting and carrying objects  walking  moving around using equipment (WC)    Self care  no deficits    Domestic Life  shopping  cooking  doing house work (cleaning house, washing dishes, laundry)  assisting others    Interactions/Relationships  no deficits    Life Areas  no deficits    Community and Social Life  recreation and leisure         high   Clinical  Presentation stable and uncomplicated low   Decision Making/ Complexity Score: low     Goals:  Short Term Goals: 4 weeks   1. The patient will subjectively report compliance with HEP to maximize functional outcomes.  2. The patient will demonstrate increase in BLE MMT by 1/2 grade where limited to improve pt's functional mobility  3. Increase knee extension ROM by 2-5 degrees where limited in order to be able to complete terminal knee extension required to perform proper heel strike during gait.  4. Patient will demonstrate proper gait biomechanics with LLE and least restrictive device for 100' to demonstrate improved AROM of hip and knee required to navigate around his home safely.    Long Term Goals: 8 weeks   1. Patient will demonstrate understanding and performance of advanced HEP to allow for independence once discharged from therapy.   2. Patient will demonstrate increase in BLE MMT by > / = 1 grade where limited to improve pt's functional mobility.  3. Patient will complete 10 STS with proper biomechanics and report of minimal/no increase in pain levels to demonstrate improved functional BLE strength required to complete daily transfers.  4. Patient complete TUG and score < / = 15 seconds with/without AD to demonstrate improved functional LE strength and decreased risk of falls in the near future.     PLAN   Plan of care Certification: 7/21/2022 to 9/23/22.    Outpatient Physical Therapy 2 times weekly for 8 weeks to include the following interventions: Aquatic Therapy, Cervical/Lumbar Traction, Gait Training, Manual Therapy, Moist Heat/ Ice, Neuromuscular Re-ed, Patient Education, Self Care, Therapeutic Activities, Therapeutic Exercise and Therapeutic Modalities.     Patient may be treated by PTA as part of his rehabilitation care team.     Angelica Sharpe, PT, DPT      I CERTIFY THE NEED FOR THESE SERVICES FURNISHED UNDER THIS PLAN OF TREATMENT AND WHILE UNDER MY CARE     Physician's comments:       Physician's  Signature: ___________________________________________________

## 2022-07-21 NOTE — PROGRESS NOTES
See treatment note for today's evaluation and plan of care. Thank you.    Angelica Sharpe, PT, DPT  7/21/2022

## 2022-07-25 ENCOUNTER — CLINICAL SUPPORT (OUTPATIENT)
Dept: REHABILITATION | Facility: HOSPITAL | Age: 47
End: 2022-07-25
Payer: MEDICAID

## 2022-07-25 DIAGNOSIS — S72.142A CLOSED DISPLACED INTERTROCHANTERIC FRACTURE OF LEFT FEMUR, INITIAL ENCOUNTER: ICD-10-CM

## 2022-07-25 DIAGNOSIS — R26.9 GAIT ABNORMALITY: ICD-10-CM

## 2022-07-25 DIAGNOSIS — Z98.890 STATUS POST OPEN REDUCTION AND INTERNAL FIXATION (ORIF) OF FRACTURE: Primary | ICD-10-CM

## 2022-07-25 DIAGNOSIS — Z87.81 STATUS POST OPEN REDUCTION AND INTERNAL FIXATION (ORIF) OF FRACTURE: Primary | ICD-10-CM

## 2022-07-25 PROCEDURE — 97110 THERAPEUTIC EXERCISES: CPT | Mod: PN,CQ

## 2022-07-25 NOTE — PROGRESS NOTES
[facility]  Physical Therapy Daily Note     Name: Maurice Bran Jr.  Clinic Number: [id number]    Therapy Diagnosis:   Encounter Diagnoses   Name Primary?    Status post open reduction and internal fixation (ORIF) of fracture Yes    Gait abnormality     Closed displaced intertrochanteric fracture of left femur, initial encounter      Physician: Sj Linares,*    Visit Date: 7/25/2022    Physician Orders: PT Eval and Treat  Medical Diagnosis from Referral: S72.142A (ICD-10-CM) - Closed displaced intertrochanteric fracture of left femur, initial encounter  Evaluation Date: 7/21/2022  Authorization Period Expiration: 8/1/2022 (eval)  Plan of Care Expiration: 9/23/22  Progress Note Due: 8/21/22  Visit # / Visits authorized: 2/20 + (eval)     Precautions: Standard and s/p ORIF L Hip    Visit #: 2 of 20  PTA Visit #: 1  Time In: 2:23pm  Time Out: 3:05pm  Total Billable Time: 42 minutes    Subjective     Pt reports: he went to [facility] with family members for camping. Patient states he only has pain in weight bearing to Left leg, other than that no other major concerns. He expressed he wants to get stronger and begin weaning off of RW.   He was compliant with home exercise program.  Response to previous treatment: first treatment day  Functional change:     Pain: 0/10 at rest, 5/10 in weight bearing activities  Location: Left hip     Objective     Maurice received therapeutic exercises to develop strength, endurance, ROM, flexibility, posture and core stabilization for 19 minutes including:  Ankle pumps bilateral 2 x 10   Quad sets with ball under knee 2 x 10  Heel slides using a sliding board 2 x 10  Supine hip abduction using a sliding board 2 x 10  Supine hip adduction with ball squeezes 2 x 10  Supine hip abduction with GTB 2 x 10   SAQ with large brown bolster LLE only 2 x 10   SLR x 10 (AAROM on LLE)  S/L hip abduction x 10 (AAROM on LLE)     Maurice  received the following manual therapy techniques: Joint mobilizations and Soft tissue Mobilization were applied to the: Left hip for 8 minutes, including:  Manual PROM hip stretches into flexion  Soft tissue to L quads, ITB, and hamstrings  Foam roller to quads, ITB, and hamstrings     Maurice participated in neuromuscular re-education activities to improve: Balance, Coordination, Sense and Proprioception for 0 minutes. The following activities were included:  None today    Maurice participated in dynamic functional therapeutic activities to improve functional performance for 5 minutes, including:  Sit to stand training at high/low table: verbal cues to scoot forward to seat edge to bring feet under base of support, hip hinge keeping spine neutral, push through quads and squeeze glutes to stand. Reverse cues for return to sit with emphasis on eccentric lowering. Work on even weight distribution to both legs.       Maurice participated in gait training to improve functional mobility and safety for 10 minutes, including:  - Pt ambulated on level tiles in therapy gym using a RW, work on step-through gait pattern, increase WB through LLE, and full knee extension in stance phase.  - Practice gait training using a SPC with 3 point gait pattern, cues to increase WB through LLE.     Home Exercises Provided and Patient Education Provided     Written Home Exercises Provided: yes.  Exercises were reviewed and Maurice was able to demonstrate them prior to the end of the session.  Maurice demonstrated good  understanding of the education provided.     Education provided:   - HEP     Assessment     Patient arrived to therapy clinic ambulating with RW, step-to gait pattern, and decrease WB through LLE. Focussed session on Left hip strengthening, gait training, and weight bearing activities. Patient demonstrates very weak hip flexors and hip abductors in SLR and S/L hip abduction exercises requiring active assist by therapist to  complete the tasks. Noted in sit to stand, patient displayed decrease weight bearing on Left leg with Right trunk lateral lean. Provided cueing to improve body midline orientation during this exercises. Ended session in gait training using a RW and SPC. Patient initially ambulates with a step-to gait pattern with RW, however, after demonstration and cues by therapist, patient was able to ambulate with a step-through gait pattern. Patient appears to ambulate fairly well with a cane with mild antalgic gait pattern. Will likely wean off of RW once patient demonstrates increase Left hip strength, flexibility, and decrease pain. As of now, he will continue to using a RW at home and in community for safety and for supports. Overall, patient completed most exercises fairly with mild discomforts and minimal pain to Left hip in weight bearing activities.     Maurice Is progressing well towards his goals.   Pt prognosis is Good.     Pt will continue to benefit from skilled outpatient physical therapy to address the deficits listed in the problem list box on initial evaluation, provide pt/family education and to maximize pt's level of independence in the home and community environment.     Pt's spiritual, cultural and educational needs considered and pt agreeable to plan of care and goals.     Anticipated barriers to physical therapy: None     Goals:   Short Term Goals: 4 weeks   1. The patient will subjectively report compliance with HEP to maximize functional outcomes.  2. The patient will demonstrate increase in BLE MMT by 1/2 grade where limited to improve pt's functional mobility  3. Increase knee extension ROM by 2-5 degrees where limited in order to be able to complete terminal knee extension required to perform proper heel strike during gait.  4. Patient will demonstrate proper gait biomechanics with LLE and least restrictive device for 100' to demonstrate improved AROM of hip and knee required to navigate around his  home safely.     Long Term Goals: 8 weeks   1. Patient will demonstrate understanding and performance of advanced HEP to allow for independence once discharged from therapy.   2. Patient will demonstrate increase in BLE MMT by > / = 1 grade where limited to improve pt's functional mobility.  3. Patient will complete 10 STS with proper biomechanics and report of minimal/no increase in pain levels to demonstrate improved functional BLE strength required to complete daily transfers.  4. Patient complete TUG and score < / = 15 seconds with/without AD to demonstrate improved functional LE strength and decreased risk of falls in the near future.     Plan   Plan of care Certification: 7/21/2022 to 9/23/22.     Outpatient Physical Therapy 2 times weekly for 8 weeks to include the following interventions: Aquatic Therapy, Cervical/Lumbar Traction, Gait Training, Manual Therapy, Moist Heat/ Ice, Neuromuscular Re-ed, Patient Education, Self Care, Therapeutic Activities, Therapeutic Exercise and Therapeutic Modalities. Patient may be treated by PTA as part of his rehabilitation care team.      Continue with POC.     Keisha Reyes, JAYLIN   7/25/2022

## 2022-07-29 ENCOUNTER — DOCUMENTATION ONLY (OUTPATIENT)
Dept: REHABILITATION | Facility: HOSPITAL | Age: 47
End: 2022-07-29
Payer: MEDICAID

## 2022-07-29 NOTE — PROGRESS NOTES
[facility]                                 Physical Therapy       Maurice Bran Jr.  MRN: [id number]    Patient called in and canceled today's PT appointment on 7/29/2022 for 1:00pm due to Covid concerns.     Keisha Reyes, PTA  7/29/2022

## 2022-08-09 ENCOUNTER — CLINICAL SUPPORT (OUTPATIENT)
Dept: REHABILITATION | Facility: HOSPITAL | Age: 47
End: 2022-08-09
Payer: MEDICAID

## 2022-08-09 DIAGNOSIS — R26.9 GAIT ABNORMALITY: ICD-10-CM

## 2022-08-09 DIAGNOSIS — Z98.890 STATUS POST OPEN REDUCTION AND INTERNAL FIXATION (ORIF) OF FRACTURE: Primary | ICD-10-CM

## 2022-08-09 DIAGNOSIS — Z87.81 STATUS POST OPEN REDUCTION AND INTERNAL FIXATION (ORIF) OF FRACTURE: Primary | ICD-10-CM

## 2022-08-09 DIAGNOSIS — S72.142A CLOSED DISPLACED INTERTROCHANTERIC FRACTURE OF LEFT FEMUR, INITIAL ENCOUNTER: ICD-10-CM

## 2022-08-09 PROCEDURE — 97140 MANUAL THERAPY 1/> REGIONS: CPT | Mod: PN

## 2022-08-09 PROCEDURE — 97110 THERAPEUTIC EXERCISES: CPT | Mod: PN

## 2022-08-09 NOTE — PROGRESS NOTES
[facility]  Physical Therapy Daily Note     Name: Maurice Bran Jr.  Clinic Number: [id number]    Therapy Diagnosis:   Encounter Diagnoses   Name Primary?    Status post open reduction and internal fixation (ORIF) of fracture Yes    Gait abnormality     Closed displaced intertrochanteric fracture of left femur, initial encounter      Physician: Sj Linares,*    Visit Date: 8/9/2022    Physician Orders: PT Eval and Treat  Medical Diagnosis from Referral: S72.142A (ICD-10-CM) - Closed displaced intertrochanteric fracture of left femur, initial encounter  Evaluation Date: 7/21/2022  Authorization Period Expiration: 12/31/2022  Plan of Care Expiration: 9/23/22  Progress Note Due: 8/21/22  Visit # / Visits authorized: 2/20 + (eval)     Precautions: Standard and s/p ORIF L Hip    Visit #: 2 of 20  PTA Visit #: 1  Time In: 8:50 am  Time Out: 9:30 am  Total Billable Time: 40 minutes    Subjective     Pt reports: he was unable to attend his previous visits due to catching COVID and quarantining at home. Patient reports he was able to complete his extensive HEP in the mean time and feels he is improving his overall strength and ability to WB into his LLE. Patient inquires whether or not he can start utilizing a SPC around the home vs. RW due to feeling increased functional BLE strength.     He was compliant with home exercise program.  Response to previous treatment: no adverse effects to report.  Functional change: progressing towards his goals of POC    Pain: 0/10 at rest, 5/10 in weight bearing activities  Location: Left hip     Objective     Maurice received therapeutic exercises to develop strength, endurance, ROM, flexibility, posture and core stabilization for 25 minutes including:  Ankle pumps bilateral 2 x 10   Quad sets with ball under knee 2 x 10  Heel slides using a sliding board 2 x 10  Supine hip abduction using a sliding board 2 x 10  Supine hip adduction with  "ball squeezes 2 x 10  Supine hip abduction with GTB 2 x 10   SAQ with large brown bolster LLE only 2 x 10   SLR 2 x 5 (AROM on LLE)  S/L hip abduction x 10 (AROM on LLE)   TKE with GTB, 2x10 with 5" holds (only on LLE)  STS from EOM, 3x5   Hamstring stretch with band, 3x30" -- given as HEP  Prone quad stretch with band, 3x30" -- given as HEP    Maurice received the following manual therapy techniques: Joint mobilizations and Soft tissue Mobilization were applied to the: Left hip for 10 minutes, including:  Manual PROM hip stretches into flexion, extension  Soft tissue to L quads, ITB, and hamstrings  Foam roller to quads, ITB, and hamstrings     Maurice participated in neuromuscular re-education activities to improve: Balance, Coordination, Sense and Proprioception for 0 minutes. The following activities were included:  None today    Maurice participated in dynamic functional therapeutic activities to improve functional performance for 0 minutes, including:    Not performed:  Sit to stand training at high/low table: verbal cues to scoot forward to seat edge to bring feet under base of support, hip hinge keeping spine neutral, push through quads and squeeze glutes to stand. Reverse cues for return to sit with emphasis on eccentric lowering. Work on even weight distribution to both legs.     Maurice participated in gait training to improve functional mobility and safety for 5 minutes, including:  - Practice gait training using a SPC with 3 point gait pattern, cues to increase WB through LLE.     Not performed:  - Pt ambulated on level tiles in therapy gym using a RW, work on step-through gait pattern, increase WB through LLE, and full knee extension in stance phase.    Home Exercises Provided and Patient Education Provided     Written Home Exercises Provided: yes.  Exercises were reviewed and Maurice was able to demonstrate them prior to the end of the session.  Maurice demonstrated good  understanding of the " education provided.     Education provided:   - HEP     Assessment     Patient returns to clinic after 3 week break from previous visit completed on 7/25/2022. Patient continues to utilize RW as main AD, with demonstrating improved step length on RLE with increased WB status on LLE present. However, observed limited terminal knee extension with proper heel strike biomechanics on LLE during gait. Initiated therapy session completing manual therapy to LLE, specifically PROM of hamstrings, quads, hip flexors and ITB to promote elongation of musculature to improve overall ROM of knee and hip. Increased muscular tightness observed with terminal knee extension and hip extension past neutral. Continued established therapeutic exercises, as well as introduce self-stretching of hamstrings and quads, as well as TKEs in standing to promote improved quad recruitment to perform terminal knee extension during gait. Patient demonstrates limited ability with maintaining active quad engagement throughout exercises today, especially with demonstration of minor quad lag during SLRs on LLE. Patient education given on importance of completing stretches and proper biomechanics to decrease chance of contractures and other compensatory strategies on LLE. Patient verbalized understanding. Ended today's session with gait training, specifically with holding SPC in right hand and performing two-point gait to improve patient's ability to navigate in his environment with least restrictive AD. PT advised patient to complete gait training at home with SPC, with continuing use of RW when navigating his environment. Patient verbalized understanding. Other than general discomforts with end-range stretches, patient tolerated all therapeutic exercises.     Maurice Is progressing well towards his goals.   Pt prognosis is Good.     Pt will continue to benefit from skilled outpatient physical therapy to address the deficits listed in the problem list box  on initial evaluation, provide pt/family education and to maximize pt's level of independence in the home and community environment.     Pt's spiritual, cultural and educational needs considered and pt agreeable to plan of care and goals.     Anticipated barriers to physical therapy: None     Goals:   Short Term Goals: 4 weeks   1. The patient will subjectively report compliance with HEP to maximize functional outcomes.  2. The patient will demonstrate increase in BLE MMT by 1/2 grade where limited to improve pt's functional mobility  3. Increase knee extension ROM by 2-5 degrees where limited in order to be able to complete terminal knee extension required to perform proper heel strike during gait.  4. Patient will demonstrate proper gait biomechanics with LLE and least restrictive device for 100' to demonstrate improved AROM of hip and knee required to navigate around his home safely.     Long Term Goals: 8 weeks   1. Patient will demonstrate understanding and performance of advanced HEP to allow for independence once discharged from therapy.   2. Patient will demonstrate increase in BLE MMT by > / = 1 grade where limited to improve pt's functional mobility.  3. Patient will complete 10 STS with proper biomechanics and report of minimal/no increase in pain levels to demonstrate improved functional BLE strength required to complete daily transfers.  4. Patient complete TUG and score < / = 15 seconds with/without AD to demonstrate improved functional LE strength and decreased risk of falls in the near future.     Plan   Plan of care Certification: 7/21/2022 to 9/23/22.     Outpatient Physical Therapy 2 times weekly for 8 weeks to include the following interventions: Aquatic Therapy, Cervical/Lumbar Traction, Gait Training, Manual Therapy, Moist Heat/ Ice, Neuromuscular Re-ed, Patient Education, Self Care, Therapeutic Activities, Therapeutic Exercise and Therapeutic Modalities. Patient may be treated by PTA as part  of his rehabilitation care team.      Continue with POC.     Angelica Sharpe, PT   8/9/2022

## 2022-08-12 ENCOUNTER — CLINICAL SUPPORT (OUTPATIENT)
Dept: REHABILITATION | Facility: HOSPITAL | Age: 47
End: 2022-08-12
Payer: MEDICAID

## 2022-08-12 DIAGNOSIS — Z87.81 STATUS POST OPEN REDUCTION AND INTERNAL FIXATION (ORIF) OF FRACTURE: Primary | ICD-10-CM

## 2022-08-12 DIAGNOSIS — S72.142A CLOSED DISPLACED INTERTROCHANTERIC FRACTURE OF LEFT FEMUR, INITIAL ENCOUNTER: ICD-10-CM

## 2022-08-12 DIAGNOSIS — R26.9 GAIT ABNORMALITY: ICD-10-CM

## 2022-08-12 DIAGNOSIS — Z98.890 STATUS POST OPEN REDUCTION AND INTERNAL FIXATION (ORIF) OF FRACTURE: Primary | ICD-10-CM

## 2022-08-12 PROCEDURE — 97110 THERAPEUTIC EXERCISES: CPT | Mod: PN,CQ

## 2022-08-12 NOTE — PROGRESS NOTES
"  [facility]  Physical Therapy Daily Note     Name: Maurice Bran Jr.  Clinic Number: [id number]    Therapy Diagnosis:   Encounter Diagnoses   Name Primary?    Status post open reduction and internal fixation (ORIF) of fracture Yes    Gait abnormality      Closed displaced intertrochanteric fracture of left femur, initial encounter      Physician: Sj Linares,*    Visit Date: 8/12/2022    Physician Orders: PT Eval and Treat  Medical Diagnosis from Referral: S72.142A (ICD-10-CM) - Closed displaced intertrochanteric fracture of left femur, initial encounter  Evaluation Date: 7/21/2022  Authorization Period Expiration: 12/31/2022  Plan of Care Expiration: 9/23/22  Progress Note Due: 8/21/22  Visit # / Visits authorized: 3/20 + (eval)     Precautions: Standard and s/p ORIF L Hip    Visit #: 3 of 20  PTA Visit #: 1  Time In: 8:00 am  Time Out: 8:40 am  Total Billable Time: 40 minutes    Subjective     Pt reports: he's getting stronger, but still feels stiff and pain in weightbearing activities such as standing and walking.     He was compliant with home exercise program.  Response to previous treatment: no adverse effects to report.  Functional change: progressing towards his goals of POC    Pain: 0/10 at rest, 5/10 in weight bearing activities  Location: Left hip     Objective     Maurice received therapeutic exercises to develop strength, endurance, ROM, flexibility, posture and core stabilization for 24 minutes including:  Ankle pumps bilateral 2 x 10   Quad sets with ball under knee 2 x 10  Heel slides using a sliding board 2 x 10  Supine hip abduction using a sliding board 2 x 10  Supine hip adduction with ball squeezes 2 x 10  Supine hip abduction with GTB 2 x 10   SAQ with large brown bolster LLE only 2 x 10   SLR 2 x 5 with 2lb (AROM on LLE)  S/L hip abduction x 10 (AROM on LLE)   TKE with GTB, 2x10 with 5" holds (only on LLE)  STS from EOM, 3x5   Hamstring stretch " "with band, 3x30"  Prone quad stretch with band, 3x30"   Supine hip flexors stretch, 3x30" holds - given today as HEP  Seated knee extension stretch, 3x30" holds - given today as HEP     Maurice received the following manual therapy techniques: Joint mobilizations and Soft tissue Mobilization were applied to the: Left hip for 0 minutes, including:    Manual PROM hip stretches into flexion, extension    Maurice participated in neuromuscular re-education activities to improve: Balance, Coordination, Sense and Proprioception for 0 minutes. The following activities were included:  None today    Maurice participated in dynamic functional therapeutic activities to improve functional performance for 0 minutes, including:    Not performed:  Sit to stand training at high/low table: verbal cues to scoot forward to seat edge to bring feet under base of support, hip hinge keeping spine neutral, push through quads and squeeze glutes to stand. Reverse cues for return to sit with emphasis on eccentric lowering. Work on even weight distribution to both legs.     Maurice participated in gait training to improve functional mobility and safety for 8 minutes, including:    - Practice gait training using a SPC with 3 point gait pattern, cues to increase WB through LLE.     Not performed:  - Pt ambulated on level tiles in therapy gym using a RW, work on step-through gait pattern, increase WB through LLE, and full knee extension in stance phase.    Home Exercises Provided and Patient Education Provided     Written Home Exercises Provided: yes.  Exercises were reviewed and Maurice was able to demonstrate them prior to the end of the session.  Maurice demonstrated good  understanding of the education provided.     Education provided:   - HEP     Assessment     Patient ambulating into therapy clinic using a RW, decrease step length, and decrease WB through LLE. Continued to focused on increase hip mobility, strengthening, and weight bearing " exercises. Added hip flexors and knee extension stretch today due to patient still demonstrates hip flexors muscles tightness and decrease full knee extension. Printed these two exercises for patient to add at home along with the rest of his exercises. Patient voiced understanding of all exercises taught in today's session.     Practiced ambulating with a SPC today which patient displayed decrease full knee extension in mid stance, hip hike, decrease stride length, and decrease WB through LLE. Provided intermittent cues and demonstration for proper gait sequences and pattern. Will need to work more on gait training and hip ROM next visit to improve his overall functional mobility.     Maurice Is progressing well towards his goals.   Pt prognosis is Good.     Pt will continue to benefit from skilled outpatient physical therapy to address the deficits listed in the problem list box on initial evaluation, provide pt/family education and to maximize pt's level of independence in the home and community environment.     Pt's spiritual, cultural and educational needs considered and pt agreeable to plan of care and goals.     Anticipated barriers to physical therapy: None     Goals:   Short Term Goals: 4 weeks   1. The patient will subjectively report compliance with HEP to maximize functional outcomes.  2. The patient will demonstrate increase in BLE MMT by 1/2 grade where limited to improve pt's functional mobility  3. Increase knee extension ROM by 2-5 degrees where limited in order to be able to complete terminal knee extension required to perform proper heel strike during gait.  4. Patient will demonstrate proper gait biomechanics with LLE and least restrictive device for 100' to demonstrate improved AROM of hip and knee required to navigate around his home safely.     Long Term Goals: 8 weeks   1. Patient will demonstrate understanding and performance of advanced HEP to allow for independence once discharged from  therapy.   2. Patient will demonstrate increase in BLE MMT by > / = 1 grade where limited to improve pt's functional mobility.  3. Patient will complete 10 STS with proper biomechanics and report of minimal/no increase in pain levels to demonstrate improved functional BLE strength required to complete daily transfers.  4. Patient complete TUG and score < / = 15 seconds with/without AD to demonstrate improved functional LE strength and decreased risk of falls in the near future.     Plan   Plan of care Certification: 7/21/2022 to 9/23/22.     Outpatient Physical Therapy 2 times weekly for 8 weeks to include the following interventions: Aquatic Therapy, Cervical/Lumbar Traction, Gait Training, Manual Therapy, Moist Heat/ Ice, Neuromuscular Re-ed, Patient Education, Self Care, Therapeutic Activities, Therapeutic Exercise and Therapeutic Modalities. Patient may be treated by PTA as part of his rehabilitation care team.      Continue with POC.     Keisha Reyes, JAYLIN   8/12/2022

## 2022-08-16 ENCOUNTER — CLINICAL SUPPORT (OUTPATIENT)
Dept: REHABILITATION | Facility: HOSPITAL | Age: 47
End: 2022-08-16
Payer: MEDICAID

## 2022-08-16 DIAGNOSIS — Z87.81 STATUS POST OPEN REDUCTION AND INTERNAL FIXATION (ORIF) OF FRACTURE: Primary | ICD-10-CM

## 2022-08-16 DIAGNOSIS — Z98.890 STATUS POST OPEN REDUCTION AND INTERNAL FIXATION (ORIF) OF FRACTURE: Primary | ICD-10-CM

## 2022-08-16 DIAGNOSIS — R26.9 GAIT ABNORMALITY: ICD-10-CM

## 2022-08-16 DIAGNOSIS — S72.142A CLOSED DISPLACED INTERTROCHANTERIC FRACTURE OF LEFT FEMUR, INITIAL ENCOUNTER: ICD-10-CM

## 2022-08-16 PROCEDURE — 97110 THERAPEUTIC EXERCISES: CPT | Mod: PN

## 2022-08-16 NOTE — PROGRESS NOTES
"  [facility]  Physical Therapy Daily Note     Name: Maurice Bran Jr.  Clinic Number: [id number]    Therapy Diagnosis:   Encounter Diagnoses   Name Primary?    Status post open reduction and internal fixation (ORIF) of fracture Yes    Gait abnormality      Closed displaced intertrochanteric fracture of left femur, initial encounter      Physician: Sj Linares,*    Visit Date: 8/16/2022    Physician Orders: PT Eval and Treat  Medical Diagnosis from Referral: S72.142A (ICD-10-CM) - Closed displaced intertrochanteric fracture of left femur, initial encounter  Evaluation Date: 7/21/2022  Authorization Period Expiration: 12/31/2022  Plan of Care Expiration: 9/23/22  Progress Note Due: 8/21/22  Visit # / Visits authorized: 3/20 + (eval)     Precautions: Standard and s/p ORIF L Hip    Visit #: 4 of 20  PTA Visit #: 1  Time In: 8:45 am  Time Out: 9:25 am  Total Billable Time: 40 minutes    Subjective     Pt reports: he will be completing x-rays tomorrow of his L hip as part of post-op protocol. Patient also reports he completed increased activity over the weekend and utilized just his SPC. Patient continues to report he feels he is getting stronger, yet has difficulty with performing full weight bearing onto his L side.     He was compliant with home exercise program.  Response to previous treatment: no adverse effects to report.  Functional change: progressing towards his goals of POC    Pain: 0/10 at rest, 5/10 in weight bearing activities  Location: Left hip     Objective     Maurice received therapeutic exercises to develop strength, endurance, ROM, flexibility, posture and core stabilization for 24 minutes including:    Ankle pumps bilateral 2 x 10   Quad sets with ball under knee 2 x 10, 5" holds  Heel slides using a sliding board 2 x 10  Supine hip abduction using a sliding board 2 x 10  Supine hip adduction with ball squeezes 2 x 10  Supine hip abduction with GTB 2 x 10 " "  SAQ with large brown bolster LLE only 2 x 10   LAQs LLE only 2 x 10  SLR 2 x 10 with 2lb (AROM on LLE)  TKE with GTB, 2x10 with 5" holds (only on LLE)    Heel raises on shuttle (starting from eccentric calf position) -- add next visit     Not performed:  STS from EOM, 3x5   S/L hip abduction x 10 (AROM on LLE)    Supine hip flexors stretch, 3x30" holds - given today as HEP  Seated knee extension stretch, 3x30" holds - given today as HEP   Hamstring stretch with band, 3x30"  Prone quad stretch with band, 3x30"     Maurice received the following manual therapy techniques: Joint mobilizations and Soft tissue Mobilization were applied to the: Left hip and knee for 8 minutes, including:    Terminal knee extension Grade IV joint mobilizations on LLE, 3x1' holds  Manual PROM L hip into IR, ER, flexion    Not performed:  Manual PROM hip stretches into extension    Maurice participated in neuromuscular re-education activities to improve: Balance, Coordination, Sense and Proprioception for 0 minutes. The following activities were included:  None today    Maurice participated in dynamic functional therapeutic activities to improve functional performance for 0 minutes, including:    Not performed:  Sit to stand training at high/low table: verbal cues to scoot forward to seat edge to bring feet under base of support, hip hinge keeping spine neutral, push through quads and squeeze glutes to stand. Reverse cues for return to sit with emphasis on eccentric lowering. Work on even weight distribution to both legs.     Maurice participated in gait training to improve functional mobility and safety for 8 minutes, including:    - Practice gait training using a SPC with 3 point gait pattern, cues to increase WB through LLE.     Not performed:  - Pt ambulated on level tiles in therapy gym using a RW, work on step-through gait pattern, increase WB through LLE, and full knee extension in stance phase.    Home Exercises Provided and " Patient Education Provided     Written Home Exercises Provided: Patient instructed to cont prior HEP.  Exercises were reviewed and Maurice was able to demonstrate them prior to the end of the session.  Maurice demonstrated good  understanding of the education provided.     Education provided:   - HEP     Assessment     Patient ambulated into clinic today with SPC, as well as demonstrated decreased step length on RLE and limited WB status and AROM of knee extension on LLE. While performing therapeutic exercises today, specifically SLRs and hip AROM movements; observed minor quad lag. PT verbalized importance of maintaining active quad contraction to improve knee extension and facilitate carryover into gait when performing heel strike and weight acceptance phase on LLE. Patient verbalized understanding. To address lack of terminal knee extension and increased hamstring tightness, performed grade IV joint mobilization on L knee into extension to stretch joint capsule and potentially improve patient's extension range. After completing mat exercises and manual therapy, observed improved active terminal knee extension in standing and while performing gait training. Verbal cue of performing heel strike on LLE in order to perform proper gait biomechanics and facilitate AROM of knee extension. Improved biomechanics observed with cue. Plan to continue improving knee and hip ROM, with potentially adding shuttle heel raises and terminal knee extension holds to further improve knee ROM next visit.     Maurice Is progressing well towards his goals.   Pt prognosis is Good.     Pt will continue to benefit from skilled outpatient physical therapy to address the deficits listed in the problem list box on initial evaluation, provide pt/family education and to maximize pt's level of independence in the home and community environment.     Pt's spiritual, cultural and educational needs considered and pt agreeable to plan of care and  goals.     Anticipated barriers to physical therapy: None     Goals:   Short Term Goals: 4 weeks   1. The patient will subjectively report compliance with HEP to maximize functional outcomes.  2. The patient will demonstrate increase in BLE MMT by 1/2 grade where limited to improve pt's functional mobility  3. Increase knee extension ROM by 2-5 degrees where limited in order to be able to complete terminal knee extension required to perform proper heel strike during gait.  4. Patient will demonstrate proper gait biomechanics with LLE and least restrictive device for 100' to demonstrate improved AROM of hip and knee required to navigate around his home safely.     Long Term Goals: 8 weeks   1. Patient will demonstrate understanding and performance of advanced HEP to allow for independence once discharged from therapy.   2. Patient will demonstrate increase in BLE MMT by > / = 1 grade where limited to improve pt's functional mobility.  3. Patient will complete 10 STS with proper biomechanics and report of minimal/no increase in pain levels to demonstrate improved functional BLE strength required to complete daily transfers.  4. Patient complete TUG and score < / = 15 seconds with/without AD to demonstrate improved functional LE strength and decreased risk of falls in the near future.     Plan     Plan of care Certification: 7/21/2022 to 9/23/22.     Outpatient Physical Therapy 2 times weekly for 8 weeks to include the following interventions: Aquatic Therapy, Cervical/Lumbar Traction, Gait Training, Manual Therapy, Moist Heat/ Ice, Neuromuscular Re-ed, Patient Education, Self Care, Therapeutic Activities, Therapeutic Exercise and Therapeutic Modalities. Patient may be treated by PTA as part of his rehabilitation care team.      Continue with POC.     Angelica Sharpe, PT   8/16/2022

## 2022-08-19 ENCOUNTER — CLINICAL SUPPORT (OUTPATIENT)
Dept: REHABILITATION | Facility: HOSPITAL | Age: 47
End: 2022-08-19
Payer: MEDICAID

## 2022-08-19 DIAGNOSIS — R26.9 GAIT ABNORMALITY: ICD-10-CM

## 2022-08-19 DIAGNOSIS — Z87.81 STATUS POST OPEN REDUCTION AND INTERNAL FIXATION (ORIF) OF FRACTURE: Primary | ICD-10-CM

## 2022-08-19 DIAGNOSIS — S72.142A CLOSED DISPLACED INTERTROCHANTERIC FRACTURE OF LEFT FEMUR, INITIAL ENCOUNTER: ICD-10-CM

## 2022-08-19 DIAGNOSIS — Z98.890 STATUS POST OPEN REDUCTION AND INTERNAL FIXATION (ORIF) OF FRACTURE: Primary | ICD-10-CM

## 2022-08-19 PROCEDURE — 97110 THERAPEUTIC EXERCISES: CPT | Mod: PN,CQ

## 2022-08-19 NOTE — PROGRESS NOTES
"  [facility]  Physical Therapy Daily Note     Name: Maurice Bran Jr.  Clinic Number: [id number]    Therapy Diagnosis:   Encounter Diagnoses   Name Primary?    Status post open reduction and internal fixation (ORIF) of fracture Yes    Gait abnormality      Closed displaced intertrochanteric fracture of left femur, initial encounter      Physician: Sj Linares,*    Visit Date: 8/19/2022    Physician Orders: PT Eval and Treat  Medical Diagnosis from Referral: S72.142A (ICD-10-CM) - Closed displaced intertrochanteric fracture of left femur, initial encounter  Evaluation Date: 7/21/2022  Authorization Period Expiration: 12/31/2022  Plan of Care Expiration: 9/23/22  Progress Note Due: 8/21/22  Visit # / Visits authorized: 5/20 + (eval)     Precautions: Standard and s/p ORIF L Hip    Visit #: 5 of 20  PTA Visit #: 1  Time In: 8:00 am  Time Out: 8:34 am  Total Billable Time: 34 minutes    Subjective     Pt reports: he recently had a follow up with his orthopedic at [facility] and everything appears to be healing well. Patient states he can raise his hip to more than 90 degrees now compared to the initial visit with PT. He finds he is making progress in PT.     He was compliant with home exercise program.  Response to previous treatment: no adverse effects to report.  Functional change: progressing towards his goals of POC    Pain: 0/10 at rest, 5/10 in weight bearing activities  Location: Left hip     Objective     Maurice received therapeutic exercises to develop strength, endurance, ROM, flexibility, posture and core stabilization for 21 minutes including:    Ankle pumps bilateral 3 x 10   Quad sets with ball under knee 3 x 10, 5" holds  Heel slides using a sliding board 3 x 10  Supine hip abduction using a sliding board 3 x 10  Supine hip adduction with ball squeezes 3 x 10  Supine hip abduction with BTB 3 x 10   SAQ with large brown bolster LLE only 3 x 10   SLR 3 x 10 with 2lb " "(AROM on LLE)  LAQs LLE only with 2lb, 2 x 10  TKE with BTB, 2x10 with 5" holds (only on LLE)  Heel raises on shuttle (starting from eccentric calf position), 3x10    Not performed:  STS from EOM, 3x5   S/L hip abduction x 10 (AROM on LLE)    Supine hip flexors stretch, 3x30" holds - given today as HEP  Seated knee extension stretch, 3x30" holds - given today as HEP   Hamstring stretch with band, 3x30"  Prone quad stretch with band, 3x30"     Maurice received the following manual therapy techniques: Joint mobilizations and Soft tissue Mobilization were applied to the: Left hip and knee for 8 minutes, including:    Terminal knee extension Grade IV joint mobilizations on LLE, 3x1' holds - deferred  Manual overpressure for full knee extension with Left ankle propped up on half foam pad, 10x10" holds   Manual PROM L hip into IR, ER, flexion    Not performed:  Manual PROM hip stretches into extension    Maurice participated in neuromuscular re-education activities to improve: Balance, Coordination, Sense and Proprioception for 0 minutes. The following activities were included:  None today    Maurice participated in dynamic functional therapeutic activities to improve functional performance for 0 minutes, including:    Not performed:  Sit to stand training at high/low table: verbal cues to scoot forward to seat edge to bring feet under base of support, hip hinge keeping spine neutral, push through quads and squeeze glutes to stand. Reverse cues for return to sit with emphasis on eccentric lowering. Work on even weight distribution to both legs.     Maurice participated in gait training to improve functional mobility and safety for 5 minutes, including:    - Practice gait training using a SPC with 3 point gait pattern, cues to increase WB through LLE.     Not performed:  - Pt ambulated on level tiles in therapy gym using a RW, work on step-through gait pattern, increase WB through LLE, and full knee extension in stance " phase.    Home Exercises Provided and Patient Education Provided     Written Home Exercises Provided: Patient instructed to cont prior HEP.  Exercises were reviewed and Maurice was able to demonstrate them prior to the end of the session.  Maurice demonstrated good  understanding of the education provided.     Education provided:   - HEP     Assessment     Patient ambulated into clinic today with SPC. Patient showed improved knee extension with less hip flexors tightness today compared to last visit. Still demonstrates minimal quadriceps muscle weakness, however, this is improving. Increase reps to 30 to all exercises and added 2lb to LAQ which patient did fairly well. Added shuttle leg press machine in calf raises position to work on established full knee extension as well as calf strengthening. Patient responded well on here with no concerns of pain. Overall, patient is making progress towards his PT goals at this time.     Maurice Is progressing well towards his goals.   Pt prognosis is Good.     Pt will continue to benefit from skilled outpatient physical therapy to address the deficits listed in the problem list box on initial evaluation, provide pt/family education and to maximize pt's level of independence in the home and community environment.     Pt's spiritual, cultural and educational needs considered and pt agreeable to plan of care and goals.     Anticipated barriers to physical therapy: None     Goals:   Short Term Goals: 4 weeks   1. The patient will subjectively report compliance with HEP to maximize functional outcomes.  2. The patient will demonstrate increase in BLE MMT by 1/2 grade where limited to improve pt's functional mobility  3. Increase knee extension ROM by 2-5 degrees where limited in order to be able to complete terminal knee extension required to perform proper heel strike during gait.  4. Patient will demonstrate proper gait biomechanics with LLE and least restrictive device for 100'  to demonstrate improved AROM of hip and knee required to navigate around his home safely.     Long Term Goals: 8 weeks   1. Patient will demonstrate understanding and performance of advanced HEP to allow for independence once discharged from therapy.   2. Patient will demonstrate increase in BLE MMT by > / = 1 grade where limited to improve pt's functional mobility.  3. Patient will complete 10 STS with proper biomechanics and report of minimal/no increase in pain levels to demonstrate improved functional BLE strength required to complete daily transfers.  4. Patient complete TUG and score < / = 15 seconds with/without AD to demonstrate improved functional LE strength and decreased risk of falls in the near future.     Plan     Plan of care Certification: 7/21/2022 to 9/23/22.     Outpatient Physical Therapy 2 times weekly for 8 weeks to include the following interventions: Aquatic Therapy, Cervical/Lumbar Traction, Gait Training, Manual Therapy, Moist Heat/ Ice, Neuromuscular Re-ed, Patient Education, Self Care, Therapeutic Activities, Therapeutic Exercise and Therapeutic Modalities. Patient may be treated by PTA as part of his rehabilitation care team.      Continue with POC.     Keisha Reyes, JAYLIN   8/19/2022

## 2022-08-23 ENCOUNTER — CLINICAL SUPPORT (OUTPATIENT)
Dept: REHABILITATION | Facility: HOSPITAL | Age: 47
End: 2022-08-23
Payer: MEDICAID

## 2022-08-23 DIAGNOSIS — R26.9 GAIT ABNORMALITY: ICD-10-CM

## 2022-08-23 DIAGNOSIS — S72.142A CLOSED DISPLACED INTERTROCHANTERIC FRACTURE OF LEFT FEMUR, INITIAL ENCOUNTER: ICD-10-CM

## 2022-08-23 DIAGNOSIS — Z98.890 STATUS POST OPEN REDUCTION AND INTERNAL FIXATION (ORIF) OF FRACTURE: Primary | ICD-10-CM

## 2022-08-23 DIAGNOSIS — Z87.81 STATUS POST OPEN REDUCTION AND INTERNAL FIXATION (ORIF) OF FRACTURE: Primary | ICD-10-CM

## 2022-08-23 PROCEDURE — 97110 THERAPEUTIC EXERCISES: CPT | Mod: PN,CQ

## 2022-08-23 NOTE — PROGRESS NOTES
"  [facility]  Physical Therapy Daily Note     Name: Maurice Bran Jr.  Clinic Number: [id number]    Therapy Diagnosis:   Encounter Diagnoses   Name Primary?    Status post open reduction and internal fixation (ORIF) of fracture Yes    Gait abnormality      Closed displaced intertrochanteric fracture of left femur, initial encounter      Physician: Sj Linares,*    Visit Date: 8/23/2022    Physician Orders: PT Eval and Treat  Medical Diagnosis from Referral: S72.142A (ICD-10-CM) - Closed displaced intertrochanteric fracture of left femur, initial encounter  Evaluation Date: 7/21/2022  Authorization Period Expiration: 12/31/2022  Plan of Care Expiration: 9/23/22  Progress Note Due: 8/21/22  Visit # / Visits authorized: 6/20 + (eval)     Precautions: Standard and s/p ORIF L Hip    Visit #: 6 of 20  PTA Visit #: 2  Time In: 845am  Time Out: 917am  Total Billable Time: 32 minutes    Subjective     Pt reports: he's going to go purchase a heel lifts for his Right leg sometimes today. Overall, he finds his getting better in general.     He was compliant with home exercise program.  Response to previous treatment: no adverse effects to report.  Functional change: progressing towards his goals of POC    Pain: 0/10 at rest, 5/10 in weight bearing activities  Location: Left hip     Objective     Maurice received therapeutic exercises to develop strength, endurance, ROM, flexibility, posture and core stabilization for 32 minutes including:    Ankle pumps bilateral 3 x 10   Quad sets with ball under knee 3 x 10, 5" holds  Heel slides using a sliding board 3 x 10  Supine hip abduction using a sliding board 3 x 10  Supine hip adduction with ball squeezes 3 x 10  Supine hip abduction with BTB 3 x 10   SAQ with large brown bolster LLE only 2lb 3 x 10   SLR 3 x 10 with 2lb (AROM on LLE)  LAQs LLE only with 2lb, 3 x 10  TKE with BTB, 3x10 with 5" holds (only on LLE)  Heel raises on shuttle " "(starting from eccentric calf position), 3x10  Recumbent bike for 5 minutes Level 4    Not performed:  STS from EOM, 3x5   S/L hip abduction x 10 (AROM on LLE)    Supine hip flexors stretch, 3x30" holds - given today as HEP  Seated knee extension stretch, 3x30" holds - given today as HEP   Hamstring stretch with band, 3x30"  Prone quad stretch with band, 3x30"     Maurice received the following manual therapy techniques: Joint mobilizations and Soft tissue Mobilization were applied to the: Left hip and knee for 0 minutes, including:    Terminal knee extension Grade IV joint mobilizations on LLE, 3x1' holds - deferred  Manual overpressure for full knee extension with Left ankle propped up on half foam pad, 10x10" holds   Manual PROM L hip into IR, ER, flexion    Not performed:  Manual PROM hip stretches into extension    Maurice participated in neuromuscular re-education activities to improve: Balance, Coordination, Sense and Proprioception for 0 minutes. The following activities were included:  None today    Maurice participated in dynamic functional therapeutic activities to improve functional performance for 0 minutes, including:    Not performed:  Sit to stand training at high/low table: verbal cues to scoot forward to seat edge to bring feet under base of support, hip hinge keeping spine neutral, push through quads and squeeze glutes to stand. Reverse cues for return to sit with emphasis on eccentric lowering. Work on even weight distribution to both legs.     Maurice participated in gait training to improve functional mobility and safety for 0 minutes, including:    - Practice gait training using a SPC with 3 point gait pattern, cues to increase WB through LLE.     Not performed:  - Pt ambulated on level tiles in therapy gym using a RW, work on step-through gait pattern, increase WB through LLE, and full knee extension in stance phase.    Home Exercises Provided and Patient Education Provided     Written Home " Exercises Provided: Patient instructed to cont prior HEP.  Exercises were reviewed and Maurice was able to demonstrate them prior to the end of the session.  Maurice demonstrated good  understanding of the education provided.     Education provided:   - HEP     Assessment     Patient arrived to therapy clinic ambulating with SPC. Continued to work on strengthening, ROM, and endurance. Patient initially began with decrease full knee extension, however, after patient completed a few of his mat exercises patient showed much improvement in knee extension. Therefore deferred manual therapy today due to patient demonstrates improved full knee extension at end of exercises. Added stationary bike for endurance, ROM, and quads strengthening which patient did well. Maurice Is progressing well towards his goals.     Pt prognosis is Good.     Pt will continue to benefit from skilled outpatient physical therapy to address the deficits listed in the problem list box on initial evaluation, provide pt/family education and to maximize pt's level of independence in the home and community environment.     Pt's spiritual, cultural and educational needs considered and pt agreeable to plan of care and goals.     Anticipated barriers to physical therapy: None     Goals:   Short Term Goals: 4 weeks   1. The patient will subjectively report compliance with HEP to maximize functional outcomes.  2. The patient will demonstrate increase in BLE MMT by 1/2 grade where limited to improve pt's functional mobility  3. Increase knee extension ROM by 2-5 degrees where limited in order to be able to complete terminal knee extension required to perform proper heel strike during gait.  4. Patient will demonstrate proper gait biomechanics with LLE and least restrictive device for 100' to demonstrate improved AROM of hip and knee required to navigate around his home safely.     Long Term Goals: 8 weeks   1. Patient will demonstrate understanding and  performance of advanced HEP to allow for independence once discharged from therapy.   2. Patient will demonstrate increase in BLE MMT by > / = 1 grade where limited to improve pt's functional mobility.  3. Patient will complete 10 STS with proper biomechanics and report of minimal/no increase in pain levels to demonstrate improved functional BLE strength required to complete daily transfers.  4. Patient complete TUG and score < / = 15 seconds with/without AD to demonstrate improved functional LE strength and decreased risk of falls in the near future.     Plan     Plan of care Certification: 7/21/2022 to 9/23/22.     Outpatient Physical Therapy 2 times weekly for 8 weeks to include the following interventions: Aquatic Therapy, Cervical/Lumbar Traction, Gait Training, Manual Therapy, Moist Heat/ Ice, Neuromuscular Re-ed, Patient Education, Self Care, Therapeutic Activities, Therapeutic Exercise and Therapeutic Modalities. Patient may be treated by PTA as part of his rehabilitation care team.      Continue with POC.     Keisha Reyes, JAYLIN   8/23/2022

## 2022-08-26 ENCOUNTER — CLINICAL SUPPORT (OUTPATIENT)
Dept: REHABILITATION | Facility: HOSPITAL | Age: 47
End: 2022-08-26
Payer: MEDICAID

## 2022-08-26 DIAGNOSIS — Z87.81 STATUS POST OPEN REDUCTION AND INTERNAL FIXATION (ORIF) OF FRACTURE: Primary | ICD-10-CM

## 2022-08-26 DIAGNOSIS — Z98.890 STATUS POST OPEN REDUCTION AND INTERNAL FIXATION (ORIF) OF FRACTURE: Primary | ICD-10-CM

## 2022-08-26 DIAGNOSIS — R26.9 GAIT ABNORMALITY: ICD-10-CM

## 2022-08-26 DIAGNOSIS — S72.142A CLOSED DISPLACED INTERTROCHANTERIC FRACTURE OF LEFT FEMUR, INITIAL ENCOUNTER: ICD-10-CM

## 2022-08-26 PROCEDURE — 97110 THERAPEUTIC EXERCISES: CPT | Mod: PN,CQ

## 2022-08-26 NOTE — PROGRESS NOTES
"  [facility]  Physical Therapy Daily Note     Name: Maurice Bran Jr.  Clinic Number: [id number]    Therapy Diagnosis:   Encounter Diagnoses   Name Primary?    Status post open reduction and internal fixation (ORIF) of fracture Yes    Gait abnormality      Closed displaced intertrochanteric fracture of left femur, initial encounter      Physician: Sj Linares,*    Visit Date: 8/26/2022    Physician Orders: PT Eval and Treat  Medical Diagnosis from Referral: S72.142A (ICD-10-CM) - Closed displaced intertrochanteric fracture of left femur, initial encounter  Evaluation Date: 7/21/2022  Authorization Period Expiration: 12/31/2022  Plan of Care Expiration: 9/23/22  Progress Note Due: 8/21/22 (UPDATE PROGRESS NOTE)  Visit # / Visits authorized: 7/20 + (eval)     Precautions: Standard and s/p ORIF L Hip    Visit #: 7 of 20  PTA Visit #: 3  Time In: 803am  Time Out: 842am  Total Billable Time: 39 minutes    Subjective     Pt reports: he finds his knee can touch the mat now than before. He still hasn't gotten the heel lifts in for the Right foot yet. He got the prescribed lists that his doctor gave him on where to go purchase the heel lifts, but he hasn't gotten the chance to get it yet.     He was compliant with home exercise program.  Response to previous treatment: no adverse effects to report.  Functional change: progressing towards his goals of POC    Pain: 0/10 at rest, 0/10 in weight bearing activities  Location: Left hip     Objective     Maurice received therapeutic exercises to develop strength, endurance, ROM, flexibility, posture and core stabilization for 34 minutes including:    Quad sets with ball under knee 3 x 10, 5" holds  Heel slides using a sliding board 3 x 10  Supine hip abduction using a sliding board 3 x 10  Supine hip adduction with ball squeezes 3 x 10  Supine hip abduction with BTB 3 x 10   SAQ with large brown bolster LLE only 3lb 3 x 10   SLR 3 x 10 " "with 3lb (AROM on LLE)  LAQs LLE only with 3lb, 3 x 10  STS from EOM, 3x5   TKE with BTB, 3x10 with 5" holds (only on LLE)  Heel raises on shuttle (starting from eccentric calf position), 3x10  Recumbent bike for 5 minutes Level 4    Not performed:  S/L hip abduction x 10 (AROM on LLE)    Supine hip flexors stretch, 3x30" holds - given today as HEP  Seated knee extension stretch, 3x30" holds - given today as HEP   Hamstring stretch with band, 3x30"  Prone quad stretch with band, 3x30"     Maurice received the following manual therapy techniques: Joint mobilizations and Soft tissue Mobilization were applied to the: Left hip and knee for 0 minutes, including:    Terminal knee extension Grade IV joint mobilizations on LLE, 3x1' holds - deferred  Manual overpressure for full knee extension with Left ankle propped up on half foam pad, 10x10" holds   Manual PROM L hip into IR, ER, flexion    Not performed:  Manual PROM hip stretches into extension    Maurice participated in neuromuscular re-education activities to improve: Balance, Coordination, Sense and Proprioception for 0 minutes. The following activities were included:  None today    Maurice participated in dynamic functional therapeutic activities to improve functional performance for 0 minutes, including:    Not performed:  Sit to stand training at high/low table: verbal cues to scoot forward to seat edge to bring feet under base of support, hip hinge keeping spine neutral, push through quads and squeeze glutes to stand. Reverse cues for return to sit with emphasis on eccentric lowering. Work on even weight distribution to both legs.     Maurice participated in gait training to improve functional mobility and safety for 5 minutes, including:    - Practice gait training using a SPC with 3 point gait pattern, cues to increase WB through LLE.     Not performed:  - Pt ambulated on level tiles in therapy gym using a RW, work on step-through gait pattern, increase WB " through LLE, and full knee extension in stance phase.    Home Exercises Provided and Patient Education Provided     Written Home Exercises Provided: Patient instructed to cont prior HEP.  Exercises were reviewed and Maurice was able to demonstrate them prior to the end of the session.  Maurice demonstrated good  understanding of the education provided.     Education provided:   - HEP     Assessment     Patient arrived to therapy clinic ambulating with SPC. Continued to work on strengthening, ROM, and endurance. Patient initially began with decrease full knee extension, however, after patient completed a few of his mat exercises patient showed much improvement in knee extension. Therefore deferred manual therapy today due to patient demonstrates improved full knee extension at end of exercises. Increase resistance from 2lb to 3lb today which patient tolerates well. Will need to update progress note to see what goals patient has met or not met.        Maurice Is progressing well towards his goals.     Pt prognosis is Good.     Pt will continue to benefit from skilled outpatient physical therapy to address the deficits listed in the problem list box on initial evaluation, provide pt/family education and to maximize pt's level of independence in the home and community environment.     Pt's spiritual, cultural and educational needs considered and pt agreeable to plan of care and goals.     Anticipated barriers to physical therapy: None     Goals:   Short Term Goals: 4 weeks   1. The patient will subjectively report compliance with HEP to maximize functional outcomes.  2. The patient will demonstrate increase in BLE MMT by 1/2 grade where limited to improve pt's functional mobility.   3. Increase knee extension ROM by 2-5 degrees where limited in order to be able to complete terminal knee extension required to perform proper heel strike during gait.  4. Patient will demonstrate proper gait biomechanics with LLE and least  restrictive device for 100' to demonstrate improved AROM of hip and knee required to navigate around his home safely.     Long Term Goals: 8 weeks   1. Patient will demonstrate understanding and performance of advanced HEP to allow for independence once discharged from therapy.   2. Patient will demonstrate increase in BLE MMT by > / = 1 grade where limited to improve pt's functional mobility.  3. Patient will complete 10 STS with proper biomechanics and report of minimal/no increase in pain levels to demonstrate improved functional BLE strength required to complete daily transfers.  4. Patient complete TUG and score < / = 15 seconds with/without AD to demonstrate improved functional LE strength and decreased risk of falls in the near future.     Plan     Plan of care Certification: 7/21/2022 to 9/23/22.     Outpatient Physical Therapy 2 times weekly for 8 weeks to include the following interventions: Aquatic Therapy, Cervical/Lumbar Traction, Gait Training, Manual Therapy, Moist Heat/ Ice, Neuromuscular Re-ed, Patient Education, Self Care, Therapeutic Activities, Therapeutic Exercise and Therapeutic Modalities. Patient may be treated by PTA as part of his rehabilitation care team.      Continue with POC.     Keisha Reyes, JAYLIN   8/26/2022

## 2022-08-29 NOTE — PROGRESS NOTES
[facility]   Physical Therapy Note     PT met face to face with Keisha Reyes PTA to discuss patient's treatment plan and progress towards established goals. Patient will be seen by a physical therapist minimally every 6th visit or every 30 days.    Please see Updated Plan of Care dated 8/30/2022 for changes and updated goals.    Angelica Sharpe, PT  8/30/2022

## 2022-08-30 ENCOUNTER — CLINICAL SUPPORT (OUTPATIENT)
Dept: REHABILITATION | Facility: HOSPITAL | Age: 47
End: 2022-08-30
Payer: MEDICAID

## 2022-08-30 DIAGNOSIS — Z87.81 STATUS POST OPEN REDUCTION AND INTERNAL FIXATION (ORIF) OF FRACTURE: Primary | ICD-10-CM

## 2022-08-30 DIAGNOSIS — S72.142A CLOSED DISPLACED INTERTROCHANTERIC FRACTURE OF LEFT FEMUR, INITIAL ENCOUNTER: ICD-10-CM

## 2022-08-30 DIAGNOSIS — Z98.890 STATUS POST OPEN REDUCTION AND INTERNAL FIXATION (ORIF) OF FRACTURE: Primary | ICD-10-CM

## 2022-08-30 DIAGNOSIS — R26.9 GAIT ABNORMALITY: ICD-10-CM

## 2022-08-30 PROCEDURE — 97110 THERAPEUTIC EXERCISES: CPT | Mod: PN

## 2022-08-30 NOTE — PLAN OF CARE
"  Outpatient Therapy Progress Note     Visit Date: 8/30/2022  Name: Maurice Bran Jr.  Clinic Number: [id number]    Therapy Diagnosis:   Encounter Diagnoses   Name Primary?    Status post open reduction and internal fixation (ORIF) of fracture Yes    Gait abnormality      Closed displaced intertrochanteric fracture of left femur, initial encounter      Physician: Sj Linares,*    Physician Orders: PT Eval and Treat  Medical Diagnosis from Referral: S72.142A (ICD-10-CM) - Closed displaced intertrochanteric fracture of left femur, initial encounter  Evaluation Date: 7/21/2022    Total Visits Received: 8 treatments, 1 evaluation  Cancelled Visits: 5  No Show Visits: 1    Current Certification Period:  7/21/2022 to 9/23/2022  Precautions:  Standard and s/p ORIF L Hip  Visits from Evaluation Date:  8    Time In: 8:45 AM   Time Out: 9:25 AM   Total Billable Time: 40 minutes    Subjective     Update: Patient reports he continues to experience increased symptoms of "hurting" on LLE when performing full WB; however has noticed a great improvement to his overall mobility since initiating skilled therapeutic intervention. At this time, patient reports he utilizes SPC vs RW when navigating in his home and in the environment with minimal issues. Patient arrives to therapy with two sole inserts placed on R shoe in order to provide unilateral height to his RLE to maintain even bilateral leg length.      Objective     Update: completed the following objective measures today to determine patient's status with completing skilled therapeutic intervention:    Gait: SPC in RUE; limited full knee and hip extension observed throughout gait cycle. Observed minor decreased step length on RLE vs. LLE due to limited full WB status on LLE     Range of Motion:      Knee Right active Right Passive Left Active Left passive   Flexion 145 145 145 145   Extension 0 0 -5 -3*      Hip Right active Right Passive Left active  Left Passive "   Flexion 140 140 135 140   Abduction 40   25     Extension           Ext. Rotation           Int. Rotation           All fields blank left intentionally and signify 'not tested'     * = increased pain when performing ROM at end-range     Lower Extremity Strength:     Right LE   Left LE     Knee extension: 4+/5 Knee extension: 4-5   Knee flexion: 4+/5 Knee flexion: 4-/5   Hip flexion: 4/5 Hip flexion: 3+/5   Hip extension:  3+/5 Hip extension: 3/5   Hip abduction: 4/5 Hip abduction: 3/5   Hip adduction: 4/5 Hip adduction 3+/5   Hip IR: 4-/5 Hip IR: 3/5   Hip ER: 4-/5 Hip ER: 3/5   Ankle dorsiflexion: 4+/5 Ankle dorsiflexion: 4/5   Ankle plantarflexion: 4+/5 Ankle plantarflexion: 4/5      Functional tests:      DL Squat: increased anterior COG with forward trunk flexion and limited triple flexion of BLE; patient completed with narrow VÍCTOR and dynamic knee valgus  SLS EO: Not assessed today; will complete once full WB status is achieved on LLE  SLS EC: Not assessed today; will complete once full WB status is achieved on LLE  5 Timed Sit to Stands: 20s with BUE support on thighs  TUs with SPC in RLE and BUE support on armrests of chair    Treatment     Maurice received the treatments listed below:       Therapeutic exercises to develop strength, endurance, ROM and posture for 38 minutes including:     Re-assessment of objective measures and goals of POC.  Knee extension stretch, propped on chair, x 5' -- given as HEP    GAIT TRAINING to improve functional mobility and safety for 2 minutes.    Ambulation of 100' x 2 with SPC and SBA -- VC to perform heel strike to facilitate terminal knee extension    Assessment     Update: Completed progress note today per patient's POC timeline. At this time, Maurice has demonstrated continuous progression towards his goals of POC with therapeutic intervention since his initial evaluation Subjectively, patient reports continuing to experience minor discomfort to his LLE pain with  full WB status, however improved ability to navigate in his environment with SPC. Objectively, patient demonstrates improved AROM of hip and knee, as well as improved quad and hamstring strength via improved MMT scores. Patient also demonstrates improved functional biomechanics and gait via improved functional test score on TUG. However, he continues to demonstrate limited terminal knee extension, as well as general L hip strength via continued scores from initial evaluation on MMT. Maurice has met 1 STGs, with progressing towards all other goals.     Maurice would benefit to continue current POC, with focusing on improving his general AROM and hip strength on LLE to address the above deficits. Discussion held on current medical status and plans for future therapeutic intervention and POC, specifically continuing current POC in order to potentially achieve all set goals. Patient verbalized understanding and agreed to continue with POC and attend future sessions.     Previous Short Term Goals Status: progressing towards all goal  New Short Term Goals Status: 1 STG met, progressing towards all others  Long Term Goal Status: continue per initial plan of care    Goals:  Short Term Goals: 4 weeks   1. The patient will subjectively report compliance with HEP to maximize functional outcomes. -- goal met. 8/30/2022  2. The patient will demonstrate increase in BLE MMT by 1/2 grade where limited to improve pt's functional mobility -- goal progressing. 8/30/2022  3. Increase knee extension ROM by 2-5 degrees where limited in order to be able to complete terminal knee extension required to perform proper heel strike during gait. -- goal progressing. 8/30/2022  4. Patient will demonstrate proper gait biomechanics with LLE and least restrictive device for 100' to demonstrate improved AROM of hip and knee required to navigate around his home safely. -- goal progressing. 8/30/2022     Long Term Goals: 8 weeks   1. Patient will  demonstrate understanding and performance of advanced HEP to allow for independence once discharged from therapy.   2. Patient will demonstrate increase in BLE MMT by > / = 1 grade where limited to improve pt's functional mobility.  3. Patient will complete 10 STS with proper biomechanics and report of minimal/no increase in pain levels to demonstrate improved functional BLE strength required to complete daily transfers.  4. Patient complete TUG and score < / = 15 seconds with/without AD to demonstrate improved functional LE strength and decreased risk of falls in the near future.     Plan     Continue POC, with focusing on improving terminal knee extension AROM, proper gait biomechanics, and strengthening of L hip musculature in order to achieve all set goals and improve patient's functional mobility to perform ADLs independently.     Angelica Sharpe, PT, DPT  8/30/2022      I CERTIFY THE NEED FOR THESE SERVICES FURNISHED UNDER THIS PLAN OF TREATMENT AND WHILE UNDER MY CARE    Physician's comments:        Physician's Signature: ___________________________________________________

## 2022-09-06 ENCOUNTER — CLINICAL SUPPORT (OUTPATIENT)
Dept: REHABILITATION | Facility: HOSPITAL | Age: 47
End: 2022-09-06
Payer: MEDICAID

## 2022-09-06 DIAGNOSIS — S72.142A CLOSED DISPLACED INTERTROCHANTERIC FRACTURE OF LEFT FEMUR, INITIAL ENCOUNTER: ICD-10-CM

## 2022-09-06 DIAGNOSIS — R26.9 GAIT ABNORMALITY: ICD-10-CM

## 2022-09-06 DIAGNOSIS — Z87.81 STATUS POST OPEN REDUCTION AND INTERNAL FIXATION (ORIF) OF FRACTURE: Primary | ICD-10-CM

## 2022-09-06 DIAGNOSIS — Z98.890 STATUS POST OPEN REDUCTION AND INTERNAL FIXATION (ORIF) OF FRACTURE: Primary | ICD-10-CM

## 2022-09-06 PROCEDURE — 97110 THERAPEUTIC EXERCISES: CPT | Mod: PN

## 2022-09-06 NOTE — PROGRESS NOTES
[facility]  Physical Therapy Daily Note     Name: Maurice Bran Jr.  Clinic Number: [id number]    Therapy Diagnosis:   Encounter Diagnoses   Name Primary?    Status post open reduction and internal fixation (ORIF) of fracture Yes    Gait abnormality      Closed displaced intertrochanteric fracture of left femur, initial encounter      Physician: Sj Linares    Visit Date: 9/6/2022    Physician Orders: PT Eval and Treat  Medical Diagnosis from Referral: S72.142A (ICD-10-CM) - Closed displaced intertrochanteric fracture of left femur, initial encounter  Evaluation Date: 7/21/2022  Authorization Period Expiration: 12/31/2022  Plan of Care Expiration: 9/23/22  Progress Note Due: 9/23/22  Visit # / Visits authorized: 9/20 + (eval)     Precautions: Standard and s/p ORIF L Hip    Visit #: 9 of 20  PTA Visit #: 0  Time In: 847am  Time Out: 935am  Total Billable Time: 48 minutes    Subjective     Pt reports: feels a little sore this morning after camping over the weekend, however feels ready to complete his exercises this morning. He reports he completed increased activity while at the campsite, specifically walking. He continues to report that he only experiences increased discomfort to his L hip when completing walking and full WB status onto LLE.     He was compliant with home exercise program.  Response to previous treatment: no adverse effects to report.  Functional change: progressing towards his goals of POC    Pain: 0/10 at rest, 2/10 in weight bearing activities  Location: Left hip     Objective     Maurice received therapeutic exercises to develop strength, endurance, ROM, flexibility, posture and core stabilization for 25 minutes including:    Recumbent bike for 5 minutes Level 4 (for ROM and endurance, supervised)  Prone knee extension stretch, 7.5# 5'   Heel raises on shuttle (starting from eccentric calf position) 2 black cords, 3x10  Shuttle press 3 black cords,  "3x10  Sidelying hip flexor/quad stretch with strap, 2x30"    Not performed:  S/L hip abduction x 10 (AROM on LLE)    Supine hip flexors stretch, 3x30" holds  Seated knee extension stretch, 3x30" holds  Hamstring stretch with band, 3x30"  Prone quad stretch with band, 3x30"   Quad sets with ball under knee 3 x 10, 5" holds  Heel slides using a sliding board 3 x 10  Supine hip abduction using a sliding board 3 x 10  Supine hip adduction with ball squeezes 3 x 10  Supine hip abduction with BTB 3 x 10   SAQ with large brown bolster LLE only 3lb 3 x 10   SLR 3 x 10 with 3lb (AROM on LLE)  LAQs LLE only with 3lb, 3 x 10  STS from EOM, 3x5   TKE with BTB, 3x10 with 5" holds (only on LLE)    Maurice received the following manual therapy techniques: Joint mobilizations and Soft tissue Mobilization were applied to the: Left hip and knee for 18 minutes, including:    Terminal knee extension Grade IV joint mobilizations on LLE, 3x1' holds  Manual PROM of hip extension, hip adduction, 3x30" holds  STM of L hamstrings, ITB band    Not performed:  Manual PROM hip stretches into extension  Manual overpressure for full knee extension with Left ankle propped up on half foam pad, 10x10" holds   Manual PROM L hip into IR, ER, flexion    Maurice participated in neuromuscular re-education activities to improve: Balance, Coordination, Sense and Proprioception for 0 minutes. The following activities were included:    None today    Maurice participated in dynamic functional therapeutic activities to improve functional performance for 0 minutes, including:    None today    Not performed:  Sit to stand training at high/low table: verbal cues to scoot forward to seat edge to bring feet under base of support, hip hinge keeping spine neutral, push through quads and squeeze glutes to stand. Reverse cues for return to sit with emphasis on eccentric lowering. Work on even weight distribution to both legs.     Maurice participated in gait training " "to improve functional mobility and safety for 5 minutes, including:    Walking on treadmill, 5' at 1.2 mph, VC to perform heel strike to facilitate active TKE    Not performed:  - Pt ambulated on level tiles in therapy gym using a RW, work on step-through gait pattern, increase WB through LLE, and full knee extension in stance phase.  - Practice gait training using a SPC with 3 point gait pattern, cues to increase WB through LLE.     Home Exercises Provided and Patient Education Provided     Written Home Exercises Provided: Patient instructed to cont prior HEP.  Exercises were reviewed and Maurice was able to demonstrate them prior to the end of the session.  Maurice demonstrated good  understanding of the education provided.     Education provided:   - HEP     Assessment     Patient ambulated into clinic today with SPC and continued demonstration of limited step length on RLE, with limited ability to perform active triple extension of LLE during gait. Today's session was focused on facilitating elongation of LLE musculature, specifically hamstrings, hip flexors, and hip abductors to improve patient's ability to perform proper gait biomechanics and terminal knee extension. Performed grade IV terminal knee extension to LLE, with observation of improved range after completion of manual therapy. While completing treadmill, VC to perform "heel-toe" during weight acceptance to facilitate active TKE. Observed improved biomechanics with cue, however patient demonstrates muscular fatigue quickly and returns to poor biomechanics after a few steps; possibly due to limited muscular endurance and increased tightness present. At this time, patient demonstrates proper joint range of extension via ability to achieve 0 degrees of L knee extension during manual therapy; however demonstrates limitations in ROM during therapeutic exercises due to increased muscular tightness, as well as limitation with potential leg length " discrepancy. PT advised patient to complete terminal knee extension, hip flexor, and hip abduction stretches as HEP in order to decrease chance of flexion contractures post-op and to improve patient's ability to perform triple extension during gait. Patient verbalized understanding.      Maurice Is progressing well towards his goals.     Pt prognosis is Good.     Pt will continue to benefit from skilled outpatient physical therapy to address the deficits listed in the problem list box on initial evaluation, provide pt/family education and to maximize pt's level of independence in the home and community environment.     Pt's spiritual, cultural and educational needs considered and pt agreeable to plan of care and goals.     Anticipated barriers to physical therapy: None     Goals:  Short Term Goals: 4 weeks   1. The patient will subjectively report compliance with HEP to maximize functional outcomes. -- goal met. 8/30/2022  2. The patient will demonstrate increase in BLE MMT by 1/2 grade where limited to improve pt's functional mobility -- goal progressing. 8/30/2022  3. Increase knee extension ROM by 2-5 degrees where limited in order to be able to complete terminal knee extension required to perform proper heel strike during gait. -- goal progressing. 8/30/2022  4. Patient will demonstrate proper gait biomechanics with LLE and least restrictive device for 100' to demonstrate improved AROM of hip and knee required to navigate around his home safely. -- goal progressing. 8/30/2022     Long Term Goals: 8 weeks   1. Patient will demonstrate understanding and performance of advanced HEP to allow for independence once discharged from therapy.   2. Patient will demonstrate increase in BLE MMT by > / = 1 grade where limited to improve pt's functional mobility.  3. Patient will complete 10 STS with proper biomechanics and report of minimal/no increase in pain levels to demonstrate improved functional BLE strength required  to complete daily transfers.  4. Patient complete TUG and score < / = 15 seconds with/without AD to demonstrate improved functional LE strength and decreased risk of falls in the near future.     Plan     Plan of care Certification: 7/21/2022 to 9/23/22.     Continue POC, with focusing on improving terminal knee extension AROM, proper gait biomechanics, and strengthening of L hip musculature in order to achieve all set goals and improve patient's functional mobility to perform ADLs independently.     Patient may be treated by PTA as part of his rehabilitation care team.     Angelica Sharpe, PT   9/6/2022

## 2022-09-09 ENCOUNTER — CLINICAL SUPPORT (OUTPATIENT)
Dept: REHABILITATION | Facility: HOSPITAL | Age: 47
End: 2022-09-09
Payer: MEDICAID

## 2022-09-09 DIAGNOSIS — Z98.890 STATUS POST OPEN REDUCTION AND INTERNAL FIXATION (ORIF) OF FRACTURE: Primary | ICD-10-CM

## 2022-09-09 DIAGNOSIS — Z87.81 STATUS POST OPEN REDUCTION AND INTERNAL FIXATION (ORIF) OF FRACTURE: Primary | ICD-10-CM

## 2022-09-09 PROCEDURE — 97110 THERAPEUTIC EXERCISES: CPT | Mod: PN,CQ

## 2022-09-09 NOTE — PROGRESS NOTES
"  [facility]  Physical Therapy Daily Note     Name: Maurice Bran Jr.  Clinic Number: [id number]    Therapy Diagnosis:   Encounter Diagnoses   Name Primary?    Status post open reduction and internal fixation (ORIF) of fracture Yes    Gait abnormality      Closed displaced intertrochanteric fracture of left femur, initial encounter      Physician: Sj Linares  Visit Date: 9/9/2022    Physician Orders: PT Eval and Treat  Medical Diagnosis from Referral: S72.142A (ICD-10-CM) - Closed displaced intertrochanteric fracture of left femur, initial encounter  Evaluation Date: 7/21/2022  Authorization Period Expiration: 12/31/2022  Plan of Care Expiration: 9/23/22  Progress Note Due: 9/23/22  Visit # / Visits authorized: 9/20 + (eval)     Precautions: Standard and s/p ORIF L Hip  Visit #: 10 of 20  PTA Visit #: 1  Time In: 802 am  Time Out: 846 am  Total Billable Time: 44 minutes    Subjective     Pt reports: denied pain at this time.     He was compliant with home exercise program.  Response to previous treatment: no adverse effects to report.  Functional change: progressing towards his goals of POC    Pain: 0/10 at rest, 2/10 in weight bearing activities  Location: Left hip     Objective     Maurice received therapeutic exercises to develop strength, endurance, ROM, flexibility, posture and core stabilization for 34 minutes including:    Recumbent bike for 5 minutes Level 4 (for ROM and endurance, supervised)  Prone knee hang extension stretch, 7.5# 5' Terminal knee extension Grade IV joint mobilizations on LLE, 3x1' holds  Heel raises on shuttle (starting from eccentric calf position) 3 black cords, 3x10  Shuttle squat press 3 black cords, 3x10  Sidelying hip flexor/quad stretch with strap, 3x30"  Supine resisted BTB hip abd 3x10  80/20 SLS at bar 3x20" (R toe down for balance and wt'ed support)  Prone QS w/ LATISHA knee lift from mat surface 10x5"  Standing TKE BTB focus on QS & " "GS 3x10  SAQ 3# on large bolster working toward full knee ext and end range quad contraction 3x10      Not performed:  S/L hip abduction x 10 (AROM on LLE)    Supine hip flexors stretch, 3x30" holds  Seated knee extension stretch, 3x30" holds  Hamstring stretch with band, 3x30"  Prone quad stretch with band, 3x30"   Quad sets with ball under knee 3 x 10, 5" holds  Heel slides using a sliding board 3 x 10  Supine hip abduction using a sliding board 3 x 10  Supine hip adduction with ball squeezes 3 x 10  Supine hip abduction with BTB 3 x 10   SAQ with large brown bolster LLE only 3lb 3 x 10   SLR 3 x 10 with 3lb (AROM on LLE)  LAQs LLE only with 3lb, 3 x 10  STS from EOM, 3x5   TKE with BTB, 3x10 with 5" holds (only on LLE)    Maurice received the following manual therapy techniques: Joint mobilizations and Soft tissue Mobilization were applied to the: Left knee/HS for 10 minutes, including:    Manual SLR stretch w/ L LE onto PTA shoulder providing knee ext overpressure at various ROM 10x15"  Manual assist hip/quad ext stretch for improved alignment while pt performed SL stretch w/ strap assist  Supine heel on ball AROM knee ext -3, PROM knee ext 0    Not performed:  Manual PROM hip stretches into extension  Manual overpressure for full knee extension with Left ankle propped up on half foam pad, 10x10" holds   Manual PROM L hip into IR, ER, flexion    Maurice participated in neuromuscular re-education activities to improve: Balance, Coordination, Sense and Proprioception for 0 minutes. The following activities were included:    None today    Maurice participated in dynamic functional therapeutic activities to improve functional performance for 0 minutes, including:    None today    Not performed:  Sit to stand training at high/low table: verbal cues to scoot forward to seat edge to bring feet under base of support, hip hinge keeping spine neutral, push through quads and squeeze glutes to stand. Reverse cues for return " to sit with emphasis on eccentric lowering. Work on even weight distribution to both legs.     Maurice participated in gait training to improve functional mobility and safety for 0 minutes, including:    Walking on treadmill, 5' at 1.2 mph, VC to perform heel strike to facilitate active TKE    Not performed:  - Pt ambulated on level tiles in therapy gym using a RW, work on step-through gait pattern, increase WB through LLE, and full knee extension in stance phase.  - Practice gait training using a SPC with 3 point gait pattern, cues to increase WB through LLE.     Home Exercises Provided and Patient Education Provided     Written Home Exercises Provided: Patient instructed to cont prior HEP.  Exercises were reviewed and Maurice was able to demonstrate them prior to the end of the session.  Maurice demonstrated good  understanding of the education provided.     Education provided:   - HEP, at previous session    Assessment     Patient ambulated into clinic today with SPC and continued demonstration of antalgic gait pattern d/t L active knee ext limitations and leg length discrepancy. Pt demonstrated correct sequencing w/ use of SPC on this day. Initiated wt shift/wt bear 80/20 SLS w/ HHA for provided support & improved discomfort tolerance. Pt unable to perform a full SLS at this time. Will cont skilled therapy in order to progress toward listed goals and work toward stability of L knee.      Maurice Is progressing well towards his goals.     Pt prognosis is Good.     Pt will continue to benefit from skilled outpatient physical therapy to address the deficits listed in the problem list box on initial evaluation, provide pt/family education and maximize pt's level of independence in the home and community environment.     Pt's spiritual, cultural and educational needs considered and pt agreeable to plan of care and goals.     Anticipated barriers to physical therapy: None     Goals:  Short Term Goals: 4 weeks   1. The  patient will subjectively report compliance with HEP to maximize functional outcomes. -- goal met. 8/30/2022  2. The patient will demonstrate increase in BLE MMT by 1/2 grade where limited to improve pt's functional mobility -- goal progressing. 8/30/2022  3. Increase knee extension ROM by 2-5 degrees where limited in order to be able to complete terminal knee extension required to perform proper heel strike during gait. -- goal progressing. 8/30/2022  4. Patient will demonstrate proper gait biomechanics with LLE and least restrictive device for 100' to demonstrate improved AROM of hip and knee required to navigate around his home safely. -- goal progressing. 8/30/2022     Long Term Goals: 8 weeks   1. Patient will demonstrate understanding and performance of advanced HEP to allow for independence once discharged from therapy.   2. Patient will demonstrate increase in BLE MMT by > / = 1 grade where limited to improve pt's functional mobility.  3. Patient will complete 10 STS with proper biomechanics and report of minimal/no increase in pain levels to demonstrate improved functional BLE strength required to complete daily transfers.  4. Patient complete TUG and score < / = 15 seconds with/without AD to demonstrate improved functional LE strength and decreased risk of falls in the near future.     Plan     Plan of care Certification: 7/21/2022 to 9/23/22.     Continue POC, with focusing on improving terminal knee extension AROM, proper gait biomechanics, and strengthening of L hip musculature in order to achieve all set goals and improve patient's functional mobility to perform ADLs independently.     Patient may be treated by PTA as part of his rehabilitation care team.     Juliann Garcia, JAYLIN   9/9/2022

## 2022-09-13 NOTE — TELEPHONE ENCOUNTER
I don't see where we gave him any new Rxs   Pt hasn't been seen in over a year. Needs an appointment for any more refills.

## 2023-07-20 ENCOUNTER — HOSPITAL ENCOUNTER (EMERGENCY)
Facility: HOSPITAL | Age: 48
Discharge: HOME | End: 2023-07-20
Attending: SURGERY
Payer: MEDICAID

## 2023-07-20 VITALS
WEIGHT: 185.5 LBS | HEART RATE: 95 BPM | DIASTOLIC BLOOD PRESSURE: 86 MMHG | RESPIRATION RATE: 18 BRPM | OXYGEN SATURATION: 96 % | BODY MASS INDEX: 23.81 KG/M2 | SYSTOLIC BLOOD PRESSURE: 156 MMHG | TEMPERATURE: 96 F | HEIGHT: 74 IN

## 2023-07-20 DIAGNOSIS — N20.0 RENAL STONE: Primary | ICD-10-CM

## 2023-07-20 LAB
ALBUMIN SERPL BCP-MCNC: 3.4 G/DL (ref 3.5–5.2)
ALP SERPL-CCNC: 72 U/L (ref 55–135)
ALT SERPL W/O P-5'-P-CCNC: 13 U/L (ref 10–44)
ANION GAP SERPL CALC-SCNC: 8 MMOL/L (ref 8–16)
AST SERPL-CCNC: 17 U/L (ref 10–40)
BACTERIA #/AREA URNS HPF: NORMAL /HPF
BASOPHILS # BLD AUTO: 0.04 K/UL (ref 0–0.2)
BASOPHILS NFR BLD: 0.3 % (ref 0–1.9)
BILIRUB SERPL-MCNC: 0.3 MG/DL (ref 0.1–1)
BILIRUB UR QL STRIP: NEGATIVE
BUN SERPL-MCNC: 12 MG/DL (ref 6–20)
CALCIUM SERPL-MCNC: 9.1 MG/DL (ref 8.7–10.5)
CHLORIDE SERPL-SCNC: 103 MMOL/L (ref 95–110)
CLARITY UR: CLEAR
CO2 SERPL-SCNC: 24 MMOL/L (ref 23–29)
COLOR UR: YELLOW
CREAT SERPL-MCNC: 0.9 MG/DL (ref 0.5–1.4)
DIFFERENTIAL METHOD: ABNORMAL
EOSINOPHIL # BLD AUTO: 0.1 K/UL (ref 0–0.5)
EOSINOPHIL NFR BLD: 1.1 % (ref 0–8)
ERYTHROCYTE [DISTWIDTH] IN BLOOD BY AUTOMATED COUNT: 13.7 % (ref 11.5–14.5)
EST. GFR  (NO RACE VARIABLE): >60 ML/MIN/1.73 M^2
GLUCOSE SERPL-MCNC: 100 MG/DL (ref 70–110)
GLUCOSE UR QL STRIP: NEGATIVE
HCT VFR BLD AUTO: 39.6 % (ref 40–54)
HGB BLD-MCNC: 13.4 G/DL (ref 14–18)
HGB UR QL STRIP: ABNORMAL
IMM GRANULOCYTES # BLD AUTO: 0.04 K/UL (ref 0–0.04)
IMM GRANULOCYTES NFR BLD AUTO: 0.3 % (ref 0–0.5)
KETONES UR QL STRIP: NEGATIVE
LEUKOCYTE ESTERASE UR QL STRIP: NEGATIVE
LIPASE SERPL-CCNC: 19 U/L (ref 4–60)
LYMPHOCYTES # BLD AUTO: 2.4 K/UL (ref 1–4.8)
LYMPHOCYTES NFR BLD: 19.3 % (ref 18–48)
MCH RBC QN AUTO: 29.8 PG (ref 27–31)
MCHC RBC AUTO-ENTMCNC: 33.8 G/DL (ref 32–36)
MCV RBC AUTO: 88 FL (ref 82–98)
MICROSCOPIC COMMENT: NORMAL
MONOCYTES # BLD AUTO: 0.8 K/UL (ref 0.3–1)
MONOCYTES NFR BLD: 6.2 % (ref 4–15)
NEUTROPHILS # BLD AUTO: 9 K/UL (ref 1.8–7.7)
NEUTROPHILS NFR BLD: 72.8 % (ref 38–73)
NITRITE UR QL STRIP: NEGATIVE
NRBC BLD-RTO: 0 /100 WBC
PH UR STRIP: 6 [PH] (ref 5–8)
PLATELET # BLD AUTO: 310 K/UL (ref 150–450)
PMV BLD AUTO: 9.3 FL (ref 9.2–12.9)
POTASSIUM SERPL-SCNC: 3.5 MMOL/L (ref 3.5–5.1)
PROT SERPL-MCNC: 6.8 G/DL (ref 6–8.4)
PROT UR QL STRIP: NEGATIVE
RBC # BLD AUTO: 4.49 M/UL (ref 4.6–6.2)
RBC #/AREA URNS HPF: 1 /HPF (ref 0–4)
SODIUM SERPL-SCNC: 135 MMOL/L (ref 136–145)
SP GR UR STRIP: 1.01 (ref 1–1.03)
URN SPEC COLLECT METH UR: ABNORMAL
UROBILINOGEN UR STRIP-ACNC: NEGATIVE EU/DL
WBC # BLD AUTO: 12.33 K/UL (ref 3.9–12.7)

## 2023-07-20 PROCEDURE — 96361 HYDRATE IV INFUSION ADD-ON: CPT

## 2023-07-20 PROCEDURE — 85025 COMPLETE CBC W/AUTO DIFF WBC: CPT | Performed by: SURGERY

## 2023-07-20 PROCEDURE — 96375 TX/PRO/DX INJ NEW DRUG ADDON: CPT

## 2023-07-20 PROCEDURE — 63600175 PHARM REV CODE 636 W HCPCS: Performed by: SURGERY

## 2023-07-20 PROCEDURE — 25000003 PHARM REV CODE 250: Performed by: SURGERY

## 2023-07-20 PROCEDURE — 96374 THER/PROPH/DIAG INJ IV PUSH: CPT

## 2023-07-20 PROCEDURE — 99285 EMERGENCY DEPT VISIT HI MDM: CPT | Mod: 25

## 2023-07-20 PROCEDURE — 81000 URINALYSIS NONAUTO W/SCOPE: CPT | Performed by: SURGERY

## 2023-07-20 PROCEDURE — 80053 COMPREHEN METABOLIC PANEL: CPT | Performed by: SURGERY

## 2023-07-20 PROCEDURE — 83690 ASSAY OF LIPASE: CPT | Performed by: SURGERY

## 2023-07-20 RX ORDER — HYDROMORPHONE HYDROCHLORIDE 1 MG/ML
2 INJECTION, SOLUTION INTRAMUSCULAR; INTRAVENOUS; SUBCUTANEOUS
Status: COMPLETED | OUTPATIENT
Start: 2023-07-20 | End: 2023-07-20

## 2023-07-20 RX ORDER — KETOROLAC TROMETHAMINE 10 MG/1
10 TABLET, FILM COATED ORAL EVERY 6 HOURS PRN
Qty: 15 TABLET | Refills: 0 | Status: SHIPPED | OUTPATIENT
Start: 2023-07-20

## 2023-07-20 RX ORDER — ONDANSETRON 2 MG/ML
4 INJECTION INTRAMUSCULAR; INTRAVENOUS
Status: COMPLETED | OUTPATIENT
Start: 2023-07-20 | End: 2023-07-20

## 2023-07-20 RX ORDER — KETOROLAC TROMETHAMINE 30 MG/ML
30 INJECTION, SOLUTION INTRAMUSCULAR; INTRAVENOUS
Status: COMPLETED | OUTPATIENT
Start: 2023-07-20 | End: 2023-07-20

## 2023-07-20 RX ORDER — CYCLOBENZAPRINE HCL 10 MG
10 TABLET ORAL 3 TIMES DAILY PRN
Qty: 15 TABLET | Refills: 0 | Status: SHIPPED | OUTPATIENT
Start: 2023-07-20 | End: 2023-07-25

## 2023-07-20 RX ADMIN — HYDROMORPHONE HYDROCHLORIDE 2 MG: 1 INJECTION, SOLUTION INTRAMUSCULAR; INTRAVENOUS; SUBCUTANEOUS at 05:07

## 2023-07-20 RX ADMIN — ONDANSETRON HYDROCHLORIDE 4 MG: 2 SOLUTION INTRAMUSCULAR; INTRAVENOUS at 05:07

## 2023-07-20 RX ADMIN — KETOROLAC TROMETHAMINE 30 MG: 30 INJECTION, SOLUTION INTRAMUSCULAR; INTRAVENOUS at 05:07

## 2023-07-20 RX ADMIN — SODIUM CHLORIDE 2000 ML: 9 INJECTION, SOLUTION INTRAVENOUS at 05:07

## 2023-07-20 NOTE — DISCHARGE INSTRUCTIONS
Please follow up with your primary care physician within 2 days. Ensure that you review all lab work results and/or imaging results. If you have any questions about your discharge paperwork please call the Emergency Department.     Return to the ED for any fevers, nausea, vomiting, abdominal pain, diarrhea, inability to take food or water by mouth without vomiting, or any new or worsening symptoms.     If you were prescribed antibiotics, please take them to completion. If you were prescribed a narcotic or any sedating medication, do not drive or operate heavy equipment or machinery while taking these medications.  If you were diagnosed with a seizure, syncope, any loss of consciousness or decreased alertness, do not drive, swim, operate heavy machinery, or per yourself in any position where a sudden loss of consciousness could put herself or others in danger.    Thank you for visiting [facility], Department of Emergency Medicine. Please see the entirety of the educational materials provided. Please note that a visit to the emergency department does not substitute ongoing care from a primary medical provider or specialist. Please ensure to follow up as recommended. However, please return to the emergency department immediately if symptoms do not improve as discussed, symptoms worsen, new symptoms develop, difficulty in following up or for any of your concerns or issues. Please note on discharge you are acknowledging understanding and agreement on medical evaluation, management recommendations and follow up recommendations.

## 2023-07-20 NOTE — ED PROVIDER NOTES
Encounter Date: 7/20/2023       History     Chief Complaint   Patient presents with    Back Pain     Pt to ED with c/o acute onset of non traumatic left sided back pain; does report mild dysuria.      Maurice Bran Jr. is a 47 y.o. male that presents with a left flank pain  Patient had acute left flank pain that started yesterday afternoon per history  Patient states left flank pain is worse in last couple hours, denies any trauma  Patient has atraumatic left flank pain, mild microscopic hematuria in the ED  Patient rates the pain as 5/10 constant, throbbing, no obvious abdomen pain    Review of patient's allergies indicates:  No Known Allergies  Past Medical History:   Diagnosis Date    Back pain     Fractures     Heart murmur     Penile cyst      Past Surgical History:   Procedure Laterality Date    TONSILLECTOMY       Family History   Problem Relation Age of Onset    Heart disease Father     No Known Problems Mother     Cancer Brother         bone     Social History     Tobacco Use    Smoking status: Light Smoker     Packs/day: 0.25     Years: 20.00     Pack years: 5.00     Types: Cigarettes    Smokeless tobacco: Never   Substance Use Topics    Alcohol use: No     Alcohol/week: 0.0 standard drinks    Drug use: No     Types: Methamphetamines, Cocaine     Comment: PAST HX. PATIENT QUIT 4 YEARS AGO.     Review of Systems   Constitutional:  Negative for fever.   HENT:  Negative for sore throat.    Respiratory:  Negative for shortness of breath.    Cardiovascular:  Negative for chest pain.   Gastrointestinal:  Negative for nausea.   Genitourinary:  Positive for flank pain. Negative for dysuria.   Musculoskeletal:  Negative for back pain.   Skin:  Negative for rash.   Neurological:  Negative for weakness.   Hematological:  Does not bruise/bleed easily.     Physical Exam     Initial Vitals [07/20/23 0344]   BP Pulse Resp Temp SpO2   (!) 168/96 90 18 96.1 °F (35.6 °C) 100 %      MAP       --         Physical  Exam    Nursing note and vitals reviewed.  Constitutional: Vital signs are normal. He appears well-developed and well-nourished. He is cooperative.   HENT:   Head: Normocephalic and atraumatic.   Right Ear: Hearing, tympanic membrane, external ear and ear canal normal.   Left Ear: Hearing, tympanic membrane, external ear and ear canal normal.   Nose: Nose normal.   Mouth/Throat: Uvula is midline, oropharynx is clear and moist and mucous membranes are normal.   Eyes: Conjunctivae, EOM and lids are normal. Pupils are equal, round, and reactive to light.   Neck: Neck supple. No JVD present.   Normal range of motion.   Full passive range of motion without pain.     Cardiovascular:  Normal rate, regular rhythm, S1 normal, S2 normal, normal heart sounds, intact distal pulses and normal pulses.           Pulmonary/Chest: Effort normal and breath sounds normal.   Abdominal: Abdomen is soft and flat. Bowel sounds are normal.   Musculoskeletal:         General: Normal range of motion.      Cervical back: Full passive range of motion without pain, normal range of motion and neck supple.     Neurological: He is alert and oriented to person, place, and time. He has normal strength.   Skin: Skin is warm, dry and intact. Capillary refill takes less than 2 seconds.       ED Course   Procedures  Labs Reviewed   URINALYSIS, REFLEX TO URINE CULTURE - Abnormal; Notable for the following components:       Result Value    Occult Blood UA 1+ (*)     All other components within normal limits    Narrative:     Specimen Source->Urine   COMPREHENSIVE METABOLIC PANEL - Abnormal; Notable for the following components:    Sodium 135 (*)     Albumin 3.4 (*)     All other components within normal limits   CBC W/ AUTO DIFFERENTIAL - Abnormal; Notable for the following components:    RBC 4.49 (*)     Hemoglobin 13.4 (*)     Hematocrit 39.6 (*)     Gran # (ANC) 9.0 (*)     All other components within normal limits   URINALYSIS MICROSCOPIC     Narrative:     Specimen Source->Urine   LIPASE          Imaging Results              CT Renal Stone Study ABD Pelvis WO (Final result)  Result time 07/20/23 08:11:24      Final result by Gogo Limon MD (07/20/23 08:11:24)                   Impression:      Mild moderate left-sided hydroureteronephrosis is noted.  There does appear to be a possible punctate stone in the region of the left UVJ measuring approximately 1-2 mm.  There is stranding of the fat adjacent to the left kidney likely related to the patient's obstruction rather than a superimposed infection.    Equivocal thickening of small bowel loops in the left hemiabdomen which could reflect under distension versus an enteritis.      Electronically signed by: Gogo Limon MD  Date:    07/20/2023  Time:    08:11               Narrative:    EXAMINATION:  CT RENAL STONE STUDY ABD PELVIS WO    CLINICAL HISTORY:  Flank pain, kidney stone suspected;Nephrolithiasis, uncomplicated;Pain lumbago (724.2);    TECHNIQUE:  Low dose axial images, sagittal and coronal reformations were obtained from the lung bases to the pubic symphysis.  Contrast was not administered.    COMPARISON:  None    FINDINGS:  The lung bases are clear.  The base of the heart appears normal.  The aorta is of normal caliber and tapers appropriately.    No radiopaque gallstones are seen.  No intrahepatic or extrahepatic biliary ductal dilatation is identified.  The unenhanced liver, spleen, pancreas and adrenal glands are unremarkable.  Both kidneys are normal in size, shape and contour.  There is moderate left-sided hydroureteronephrosis secondary to a punctate stone at the level of the left UVJ which is barely seen (series 2, image 143).  No right-sided obstructive uropathy is identified.  There is some left-sided perinephric stranding.  Urinary bladder is partially distended and has a normal appearance.    Constipation.  Appendix is normal.  No free air, free fluid or obstruction.  Slightly  thickened loops of small bowel seen in the left upper quadrant which could represent under distension versus an enteritis.    Postoperative changes of the left hip.  Age-appropriate degenerative changes affect the skeleton.                                       X-Ray Lumbar Spine Ap And Lateral (Final result)  Result time 07/20/23 07:49:17      Final result by Gogo Limon MD (07/20/23 07:49:17)                   Impression:      As above.      Electronically signed by: Gogo Limon MD  Date:    07/20/2023  Time:    07:49               Narrative:    EXAMINATION:  XR LUMBAR SPINE AP AND LATERAL    TECHNIQUE:  AP, lateral and spot images were performed of the lumbar spine.    COMPARISON:  12/05/2016    FINDINGS:  Alignment: Dextroscoliotic curvature.    Vertebrae: Vertebral body heights are maintained.  No suspicious appearing lytic or blastic lesions.    Discs and facets: Multilevel disc space narrowing with endplate sclerosis and marginal osteophytosis.  Facet arthropathy at L4-5 and L5-S1.    Miscellaneous: No additional findings.                                       Medications   sodium chloride 0.9% bolus 2,000 mL 2,000 mL (0 mLs Intravenous Stopped 7/20/23 0605)   HYDROmorphone injection 2 mg (2 mg Intravenous Given 7/20/23 0507)   ondansetron injection 4 mg (4 mg Intravenous Given 7/20/23 0506)   ketorolac injection 30 mg (30 mg Intravenous Given 7/20/23 0507)                 ED Course as of 07/21/23 1103   Thu Jul 20, 2023   0703 Patient found to have minimal stranding around proximal ureter suggestive of recently passed renal stone. [NB]      ED Course User Index  [NB] Rivera Casarez MD          Final disposition for this patient was given by Dr. Rivera Casarez at 6am shift change       Clinical Impression:   Final diagnoses:  [N20.0] Renal stone (Primary)            Luciano Larson MD  07/21/23 1104